# Patient Record
Sex: FEMALE | Race: WHITE | URBAN - METROPOLITAN AREA
[De-identification: names, ages, dates, MRNs, and addresses within clinical notes are randomized per-mention and may not be internally consistent; named-entity substitution may affect disease eponyms.]

---

## 2017-06-14 ENCOUNTER — OUTPATIENT (OUTPATIENT)
Dept: OUTPATIENT SERVICES | Facility: HOSPITAL | Age: 52
LOS: 1 days | Discharge: HOME | End: 2017-06-14

## 2017-06-28 DIAGNOSIS — E55.9 VITAMIN D DEFICIENCY, UNSPECIFIED: ICD-10-CM

## 2017-06-28 DIAGNOSIS — R53.83 OTHER FATIGUE: ICD-10-CM

## 2017-06-28 DIAGNOSIS — D72.829 ELEVATED WHITE BLOOD CELL COUNT, UNSPECIFIED: ICD-10-CM

## 2017-06-28 DIAGNOSIS — R94.5 ABNORMAL RESULTS OF LIVER FUNCTION STUDIES: ICD-10-CM

## 2017-06-28 DIAGNOSIS — R21 RASH AND OTHER NONSPECIFIC SKIN ERUPTION: ICD-10-CM

## 2017-06-28 DIAGNOSIS — D64.9 ANEMIA, UNSPECIFIED: ICD-10-CM

## 2017-12-29 PROBLEM — Z00.00 ENCOUNTER FOR PREVENTIVE HEALTH EXAMINATION: Status: ACTIVE | Noted: 2017-12-29

## 2018-03-07 ENCOUNTER — APPOINTMENT (OUTPATIENT)
Dept: PLASTIC SURGERY | Facility: CLINIC | Age: 53
End: 2018-03-07

## 2018-05-24 ENCOUNTER — OUTPATIENT (OUTPATIENT)
Dept: OUTPATIENT SERVICES | Facility: HOSPITAL | Age: 53
LOS: 1 days | Discharge: HOME | End: 2018-05-24

## 2018-05-24 DIAGNOSIS — R21 RASH AND OTHER NONSPECIFIC SKIN ERUPTION: ICD-10-CM

## 2018-05-24 DIAGNOSIS — R10.9 UNSPECIFIED ABDOMINAL PAIN: ICD-10-CM

## 2018-05-24 DIAGNOSIS — I10 ESSENTIAL (PRIMARY) HYPERTENSION: ICD-10-CM

## 2018-05-25 ENCOUNTER — OUTPATIENT (OUTPATIENT)
Dept: OUTPATIENT SERVICES | Facility: HOSPITAL | Age: 53
LOS: 1 days | Discharge: HOME | End: 2018-05-25

## 2018-05-25 DIAGNOSIS — R10.9 UNSPECIFIED ABDOMINAL PAIN: ICD-10-CM

## 2018-05-27 ENCOUNTER — OUTPATIENT (OUTPATIENT)
Dept: OUTPATIENT SERVICES | Facility: HOSPITAL | Age: 53
LOS: 1 days | Discharge: HOME | End: 2018-05-27

## 2018-05-27 DIAGNOSIS — D17.71 BENIGN LIPOMATOUS NEOPLASM OF KIDNEY: ICD-10-CM

## 2018-05-27 DIAGNOSIS — R19.7 DIARRHEA, UNSPECIFIED: ICD-10-CM

## 2018-05-27 DIAGNOSIS — R10.9 UNSPECIFIED ABDOMINAL PAIN: ICD-10-CM

## 2018-05-27 DIAGNOSIS — N20.0 CALCULUS OF KIDNEY: ICD-10-CM

## 2018-06-15 ENCOUNTER — OUTPATIENT (OUTPATIENT)
Dept: OUTPATIENT SERVICES | Facility: HOSPITAL | Age: 53
LOS: 1 days | Discharge: HOME | End: 2018-06-15

## 2018-06-15 DIAGNOSIS — N28.9 DISORDER OF KIDNEY AND URETER, UNSPECIFIED: ICD-10-CM

## 2018-11-15 ENCOUNTER — OUTPATIENT (OUTPATIENT)
Dept: OUTPATIENT SERVICES | Facility: HOSPITAL | Age: 53
LOS: 1 days | Discharge: HOME | End: 2018-11-15

## 2018-11-15 DIAGNOSIS — N64.4 MASTODYNIA: ICD-10-CM

## 2018-11-15 DIAGNOSIS — N63.10 UNSPECIFIED LUMP IN THE RIGHT BREAST, UNSPECIFIED QUADRANT: ICD-10-CM

## 2018-11-15 DIAGNOSIS — N63.20 UNSPECIFIED LUMP IN THE LEFT BREAST, UNSPECIFIED QUADRANT: ICD-10-CM

## 2019-01-23 ENCOUNTER — OUTPATIENT (OUTPATIENT)
Dept: OUTPATIENT SERVICES | Facility: HOSPITAL | Age: 54
LOS: 1 days | Discharge: HOME | End: 2019-01-23

## 2019-01-23 DIAGNOSIS — R94.5 ABNORMAL RESULTS OF LIVER FUNCTION STUDIES: ICD-10-CM

## 2019-01-23 DIAGNOSIS — R10.9 UNSPECIFIED ABDOMINAL PAIN: ICD-10-CM

## 2019-01-23 DIAGNOSIS — J01.01 ACUTE RECURRENT MAXILLARY SINUSITIS: ICD-10-CM

## 2019-01-23 DIAGNOSIS — R10.2 PELVIC AND PERINEAL PAIN: ICD-10-CM

## 2019-01-23 DIAGNOSIS — M54.5 LOW BACK PAIN: ICD-10-CM

## 2019-01-23 DIAGNOSIS — R42 DIZZINESS AND GIDDINESS: ICD-10-CM

## 2019-01-25 ENCOUNTER — OUTPATIENT (OUTPATIENT)
Dept: OUTPATIENT SERVICES | Facility: HOSPITAL | Age: 54
LOS: 1 days | Discharge: HOME | End: 2019-01-25

## 2019-01-25 DIAGNOSIS — R42 DIZZINESS AND GIDDINESS: ICD-10-CM

## 2019-01-26 ENCOUNTER — OUTPATIENT (OUTPATIENT)
Dept: OUTPATIENT SERVICES | Facility: HOSPITAL | Age: 54
LOS: 1 days | Discharge: HOME | End: 2019-01-26

## 2019-01-26 DIAGNOSIS — R10.11 RIGHT UPPER QUADRANT PAIN: ICD-10-CM

## 2019-02-06 ENCOUNTER — OUTPATIENT (OUTPATIENT)
Dept: OUTPATIENT SERVICES | Facility: HOSPITAL | Age: 54
LOS: 1 days | Discharge: HOME | End: 2019-02-06

## 2019-02-06 DIAGNOSIS — R10.11 RIGHT UPPER QUADRANT PAIN: ICD-10-CM

## 2019-02-15 ENCOUNTER — APPOINTMENT (OUTPATIENT)
Dept: BREAST CENTER | Facility: CLINIC | Age: 54
End: 2019-02-15
Payer: MEDICARE

## 2019-02-15 VITALS
BODY MASS INDEX: 19.99 KG/M2 | DIASTOLIC BLOOD PRESSURE: 76 MMHG | WEIGHT: 120 LBS | HEIGHT: 65 IN | SYSTOLIC BLOOD PRESSURE: 122 MMHG | TEMPERATURE: 97.7 F

## 2019-02-15 DIAGNOSIS — R92.2 INCONCLUSIVE MAMMOGRAM: ICD-10-CM

## 2019-02-15 DIAGNOSIS — Z85.828 PERSONAL HISTORY OF OTHER MALIGNANT NEOPLASM OF SKIN: ICD-10-CM

## 2019-02-15 DIAGNOSIS — Z87.442 PERSONAL HISTORY OF URINARY CALCULI: ICD-10-CM

## 2019-02-15 DIAGNOSIS — N64.4 MASTODYNIA: ICD-10-CM

## 2019-02-15 DIAGNOSIS — Z80.3 FAMILY HISTORY OF MALIGNANT NEOPLASM OF BREAST: ICD-10-CM

## 2019-02-15 PROCEDURE — 99203 OFFICE O/P NEW LOW 30 MIN: CPT

## 2019-02-18 PROBLEM — N64.4 BREAST PAIN, LEFT: Status: ACTIVE | Noted: 2019-02-18

## 2019-02-18 PROBLEM — Z85.828 HISTORY OF BASAL CELL CARCINOMA: Status: RESOLVED | Noted: 2019-02-18 | Resolved: 2019-02-18

## 2019-02-18 PROBLEM — Z80.3 FAMILY HISTORY OF BREAST CANCER: Status: ACTIVE | Noted: 2019-02-18

## 2019-02-18 PROBLEM — Z87.442 HISTORY OF KIDNEY STONES: Status: RESOLVED | Noted: 2019-02-18 | Resolved: 2019-02-18

## 2019-02-18 RX ORDER — SUCRALFATE 1 G/1
TABLET ORAL
Refills: 0 | Status: ACTIVE | COMMUNITY

## 2019-02-18 RX ORDER — ATENOLOL 50 MG/1
TABLET ORAL
Refills: 0 | Status: ACTIVE | COMMUNITY

## 2019-02-18 RX ORDER — MECLIZINE HYDROCHLORIDE 25 MG/1
TABLET ORAL
Refills: 0 | Status: ACTIVE | COMMUNITY

## 2019-02-18 RX ORDER — AMLODIPINE BESYLATE 5 MG/1
TABLET ORAL
Refills: 0 | Status: ACTIVE | COMMUNITY

## 2019-02-18 RX ORDER — DOCUSATE SODIUM 100 MG/1
CAPSULE ORAL
Refills: 0 | Status: ACTIVE | COMMUNITY

## 2019-02-18 RX ORDER — POTASSIUM CHLORIDE 10 MEQ
CAPSULE, EXTENDED RELEASE ORAL
Refills: 0 | Status: ACTIVE | COMMUNITY

## 2019-02-18 RX ORDER — QUETIAPINE 400 MG/1
TABLET, FILM COATED ORAL
Refills: 0 | Status: ACTIVE | COMMUNITY

## 2019-02-18 RX ORDER — OMEPRAZOLE MAGNESIUM 40 MG/1
CAPSULE, DELAYED RELEASE ORAL
Refills: 0 | Status: ACTIVE | COMMUNITY

## 2019-02-18 RX ORDER — FENOFIBRATE 145 MG/1
TABLET ORAL
Refills: 0 | Status: ACTIVE | COMMUNITY

## 2019-02-18 RX ORDER — CYCLOBENZAPRINE HCL 5 MG
TABLET ORAL
Refills: 0 | Status: ACTIVE | COMMUNITY

## 2019-02-18 RX ORDER — ALPRAZOLAM 2 MG/1
TABLET ORAL
Refills: 0 | Status: ACTIVE | COMMUNITY

## 2019-02-18 RX ORDER — ZOLPIDEM TARTRATE 5 MG/1
TABLET, FILM COATED ORAL
Refills: 0 | Status: ACTIVE | COMMUNITY

## 2019-02-18 RX ORDER — SERTRALINE HYDROCHLORIDE 25 MG/1
TABLET, FILM COATED ORAL
Refills: 0 | Status: ACTIVE | COMMUNITY

## 2019-02-22 ENCOUNTER — OTHER (OUTPATIENT)
Age: 54
End: 2019-02-22

## 2019-02-22 ENCOUNTER — APPOINTMENT (OUTPATIENT)
Dept: OTOLARYNGOLOGY | Facility: CLINIC | Age: 54
End: 2019-02-22
Payer: MEDICARE

## 2019-02-22 DIAGNOSIS — J32.0 CHRONIC MAXILLARY SINUSITIS: ICD-10-CM

## 2019-02-22 DIAGNOSIS — F32.9 MAJOR DEPRESSIVE DISORDER, SINGLE EPISODE, UNSPECIFIED: ICD-10-CM

## 2019-02-22 DIAGNOSIS — I10 ESSENTIAL (PRIMARY) HYPERTENSION: ICD-10-CM

## 2019-02-22 DIAGNOSIS — F17.200 NICOTINE DEPENDENCE, UNSPECIFIED, UNCOMPLICATED: ICD-10-CM

## 2019-02-22 PROCEDURE — 99204 OFFICE O/P NEW MOD 45 MIN: CPT | Mod: 25

## 2019-02-22 PROCEDURE — 31231 NASAL ENDOSCOPY DX: CPT

## 2019-02-22 NOTE — PHYSICAL EXAM
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

## 2019-02-22 NOTE — ASSESSMENT
[FreeTextEntry1] : - rx for nasoneb given to patient (written rx)\par - will refer to oral surgery, Dr Liriano to discuss surgical repair options, would do jointly with oral surgery\par - f/up in 1-2 months

## 2019-02-22 NOTE — CONSULT LETTER
[Dear  ___] : Dear  [unfilled], [Consult Letter:] : I had the pleasure of evaluating your patient, [unfilled]. [Please see my note below.] : Please see my note below. [Consult Closing:] : Thank you very much for allowing me to participate in the care of this patient.  If you have any questions, please do not hesitate to contact me. [Sincerely,] : Sincerely, [FreeTextEntry2] : Dr. Jr Anna [FreeTextEntry3] : Francesca Anders MD\par Otolaryngology - Head & Neck Surgery\par

## 2019-02-22 NOTE — HISTORY OF PRESENT ILLNESS
[de-identified] : Patient here today due to hole in her palate. Patient admits she has it since she was young. Patient admits it is getting bigger. She has been using a machine to clean the area, unable to clean it recently as machine broke 1.5 months ago, machine ordered by ENT (nasal nebulizer) She has nasal congestion. She has false teeth which covers the hole up. Patient admits she had the opening as a child, it has gotten bigger. She used to inhale moth balls. Sore throat from time to time. Hx basal cell carcinoma external nose removed 10 years ago.

## 2019-02-26 ENCOUNTER — OUTPATIENT (OUTPATIENT)
Dept: OUTPATIENT SERVICES | Facility: HOSPITAL | Age: 54
LOS: 1 days | Discharge: HOME | End: 2019-02-26

## 2019-02-26 DIAGNOSIS — R42 DIZZINESS AND GIDDINESS: ICD-10-CM

## 2019-03-20 NOTE — DATA REVIEWED
[FreeTextEntry1] : EXAM: US BREAST COMPLETE BI \par EXAM: MG MAMMO DIAG W MISAEL BI# \par \par \par PROCEDURE DATE: 11/15/2018 \par \par \par \par INTERPRETATION: Clinical History / Reason for exam: Patient presents with a \par palpable lump in the periareolar region of each breast. \par \par The patient reports her last clinical breast examination was performed over \par one year ago. \par \par Spot magnification imaging of the symptomatic areas was performed in \par addition to routine mammographic projections. \par \par Computer-aided detection was utilized in the interpretation of this \par examination. \par \par Comparison is made to the prior examination dated February 17, 2012. \par \par Breast composition:The breasts are heterogeneously dense, which may obscure \par small masses. \par \par There are no suspicious masses, areas of architectural distortion or cluster \par of microcalcifications in either breast. The spot magnification views of the \par symptomatic area show no suspicious findings. \par \par Whole Bilateral Breast Sonogram: \par \par No solid/cystic lesions or areas of abnormal shadowing are seen in either \par breast. \par \par Evaluation of both axillary regions demonstrates normal-appearing lymph \par nodes. \par \par IMPRESSION: No mammographic or sonographic evidence of malignancy. \par \par No mammographic or sonographic correlate for the reported palpable \par abnormality in each breast. The failure to confirm a lesion mammographically \par or sonographically should not deter biopsy if there is felt to be a \par clinically suspicious palpable abnormality. \par \par Recommendation: Any decision to biopsy the palpable abnormality should be \par based on the level of clinical concern. \par \par BI-RADS Category 1: Negative \par \par \par \par \par \par \par \par SAÚL HIRSCH M.D., ATTENDING RADIOLOGIST \par This document has been electronically signed. Nov 15 2018 3:48PM \par \par EXAM: US BREAST COMPLETE BI \par EXAM: MG MAMMO DIAG W MISAEL BI# \par \par \par PROCEDURE DATE: 11/15/2018 \par \par \par \par INTERPRETATION: Clinical History / Reason for exam: Patient presents with a \par palpable lump in the periareolar region of each breast. \par \par The patient reports her last clinical breast examination was performed over \par one year ago. \par \par Spot magnification imaging of the symptomatic areas was performed in \par addition to routine mammographic projections. \par \par Computer-aided detection was utilized in the interpretation of this \par examination. \par \par Comparison is made to the prior examination dated February 17, 2012. \par \par Breast composition:The breasts are heterogeneously dense, which may obscure \par small masses. \par \par There are no suspicious masses, areas of architectural distortion or cluster \par of microcalcifications in either breast. The spot magnification views of the \par symptomatic area show no suspicious findings. \par \par Whole Bilateral Breast Sonogram: \par \par No solid/cystic lesions or areas of abnormal shadowing are seen in either \par breast. \par \par Evaluation of both axillary regions demonstrates normal-appearing lymph \par nodes. \par \par IMPRESSION: No mammographic or sonographic evidence of malignancy. \par \par No mammographic or sonographic correlate for the reported palpable \par abnormality in each breast. The failure to confirm a lesion mammographically \par or sonographically should not deter biopsy if there is felt to be a \par clinically suspicious palpable abnormality. \par \par Recommendation: Any decision to biopsy the palpable abnormality should be \par based on the level of clinical concern. \par \par BI-RADS Category 1: Negative \par \par \par \par \par \par \par \par SAÚL HIRSCH M.D., ATTENDING RADIOLOGIST \par This document has been electronically signed. Nov 15 2018 3:48PM \par \par

## 2019-03-20 NOTE — CONSULT LETTER
[Dear  ___] : Dear  [unfilled], [Consult Letter:] : I had the pleasure of evaluating your patient, [unfilled]. [Please see my note below.] : Please see my note below. [Consult Closing:] : Thank you very much for allowing me to participate in the care of this patient.  If you have any questions, please do not hesitate to contact me. [Sincerely,] : Sincerely, [FreeTextEntry2] : Jr Anna MD\par [address], [address]\[place] [address] [FreeTextEntry3] : Amarilys Osman MD \par Breast Surgical Oncologist\par [facility]\par [facility]\par [facility]\par

## 2019-03-20 NOTE — ASSESSMENT
[FreeTextEntry1] : Magali is a 53 F who presents with left breast pain and dense breasts. \par \par On exam, no suspicious masses were palpated in either breast.  She has chronic R nipple inversion and a fullness in the left retroareolar/UOQ area.  Her most recent imaging was a b/l dx tomosynthesis and US on 11/15/18 which was also unrevealing for any suspicious lesions.  She will be due for her next screening mammogram on 11/15/19.  This will be scheduled for her today. \par \par We discussed dense breasts.  Increasing breast density has been found to increase ones risk of breast cancer, but at this time, there is no clear indication for additional imaging in this setting, as both US and MRI have not been found to improve survival.  One can consider bilateral screening US.  However, out of 1000 women screened, the use of routine US will only identify an additional 3-5 cancers.  The use of US was found to increase the likelihood of undergoing more imaging and more biopsies.  She does have dense breasts.  We have decided to proceed with screening bilateral breast US at this time.  This will be scheduled with her next screening mammogram.\par \par In regards to her breast pain, it may be related to fibrocystic changes within her breast that are hormonally influenced. We spoke about possible interventions including evening primrose oil, supportive bras, and decreasing caffeine intake.  Although none of these have been consistently proven to improve breast pain, they may be tried.  If the pain becomes very severe, there have been studies of tamoxifen being effective for the treatment of breast pain, although there are risks with tamoxifen.  At this time she will try supportive measures.\par \par She is otherwise at an average risk for breast cancer based off her family history.  She should continue with annual screening mammograms/US. \par \par ALl of her questions were answered.  She knows to call back with any further questions or concerns. \par \par PLAN: \par -b/l mammogram and US\par -f/up after imaging

## 2019-03-20 NOTE — PHYSICAL EXAM
[Normocephalic] : normocephalic [Atraumatic] : atraumatic [EOMI] : extra ocular movement intact [No Supraclavicular Adenopathy] : no supraclavicular adenopathy [No Cervical Adenopathy] : no cervical adenopathy [No dominant masses] : no dominant masses in right breast  [No dominant masses] : no dominant masses left breast [No Nipple Retraction] : no left nipple retraction [No Nipple Discharge] : no left nipple discharge [Examined in the supine and seated position] : examined in the supine and seated position [No Axillary Lymphadenopathy] : no left axillary lymphadenopathy [Soft] : abdomen soft [Not Tender] : non-tender [No Edema] : no edema [No Swelling] : no swelling [No Rashes] : no rashes [No Ulceration] : no ulceration [de-identified] : no suspicious masses palpated; nipple inversion (has been present for many years) [de-identified] : fullness in the retroareolar area/UOQ, but no suspicious masses palpated

## 2019-03-20 NOTE — PAST MEDICAL HISTORY
[Menarche Age ____] : age at menarche was [unfilled] [Menopause Age____] : age at menopause was [unfilled] [Total Preg ___] : G[unfilled] [Live Births ___] : P[unfilled]  [Age At Live Birth ___] : Age at live birth: [unfilled] [History of Hormone Replacement Treatment] : has no history of hormone replacement treatment [FreeTextEntry5] : denies  [FreeTextEntry6] : denies [FreeTextEntry7] : yes in past x 1 year, stopped in 1987 [FreeTextEntry8] : denies
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
## 2019-03-20 NOTE — REVIEW OF SYSTEMS
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

## 2019-11-11 ENCOUNTER — OUTPATIENT (OUTPATIENT)
Dept: OUTPATIENT SERVICES | Facility: HOSPITAL | Age: 54
LOS: 1 days | Discharge: HOME | End: 2019-11-11
Payer: MEDICARE

## 2019-11-11 ENCOUNTER — OUTPATIENT (OUTPATIENT)
Dept: OUTPATIENT SERVICES | Facility: HOSPITAL | Age: 54
LOS: 1 days | Discharge: HOME | End: 2019-11-11

## 2019-11-11 DIAGNOSIS — E04.1 NONTOXIC SINGLE THYROID NODULE: ICD-10-CM

## 2019-11-11 DIAGNOSIS — E03.9 HYPOTHYROIDISM, UNSPECIFIED: ICD-10-CM

## 2019-11-11 DIAGNOSIS — I10 ESSENTIAL (PRIMARY) HYPERTENSION: ICD-10-CM

## 2019-11-11 DIAGNOSIS — E78.9 DISORDER OF LIPOPROTEIN METABOLISM, UNSPECIFIED: ICD-10-CM

## 2019-11-11 PROCEDURE — 76536 US EXAM OF HEAD AND NECK: CPT | Mod: 26

## 2019-11-13 DIAGNOSIS — E78.9 DISORDER OF LIPOPROTEIN METABOLISM, UNSPECIFIED: ICD-10-CM

## 2019-11-13 DIAGNOSIS — I10 ESSENTIAL (PRIMARY) HYPERTENSION: ICD-10-CM

## 2019-11-13 DIAGNOSIS — Z02.9 ENCOUNTER FOR ADMINISTRATIVE EXAMINATIONS, UNSPECIFIED: ICD-10-CM

## 2019-11-13 DIAGNOSIS — E03.9 HYPOTHYROIDISM, UNSPECIFIED: ICD-10-CM

## 2019-11-19 ENCOUNTER — FORM ENCOUNTER (OUTPATIENT)
Age: 54
End: 2019-11-19

## 2019-11-20 ENCOUNTER — OUTPATIENT (OUTPATIENT)
Dept: OUTPATIENT SERVICES | Facility: HOSPITAL | Age: 54
LOS: 1 days | Discharge: HOME | End: 2019-11-20
Payer: MEDICARE

## 2019-11-20 DIAGNOSIS — R92.2 INCONCLUSIVE MAMMOGRAM: ICD-10-CM

## 2019-11-20 DIAGNOSIS — Z12.31 ENCOUNTER FOR SCREENING MAMMOGRAM FOR MALIGNANT NEOPLASM OF BREAST: ICD-10-CM

## 2019-11-20 PROCEDURE — 77063 BREAST TOMOSYNTHESIS BI: CPT | Mod: 26

## 2019-11-20 PROCEDURE — 77067 SCR MAMMO BI INCL CAD: CPT | Mod: 26

## 2019-11-20 PROCEDURE — 76641 ULTRASOUND BREAST COMPLETE: CPT | Mod: 26,50

## 2019-11-21 ENCOUNTER — OUTPATIENT (OUTPATIENT)
Dept: OUTPATIENT SERVICES | Facility: HOSPITAL | Age: 54
LOS: 1 days | Discharge: HOME | End: 2019-11-21

## 2019-11-21 DIAGNOSIS — R73.9 HYPERGLYCEMIA, UNSPECIFIED: ICD-10-CM

## 2020-12-04 ENCOUNTER — APPOINTMENT (OUTPATIENT)
Dept: BREAST CENTER | Facility: CLINIC | Age: 55
End: 2020-12-04

## 2021-06-03 ENCOUNTER — OUTPATIENT (OUTPATIENT)
Dept: OUTPATIENT SERVICES | Facility: HOSPITAL | Age: 56
LOS: 1 days | Discharge: HOME | End: 2021-06-03
Payer: MEDICARE

## 2021-06-03 DIAGNOSIS — Z12.31 ENCOUNTER FOR SCREENING MAMMOGRAM FOR MALIGNANT NEOPLASM OF BREAST: ICD-10-CM

## 2021-06-03 PROCEDURE — 77063 BREAST TOMOSYNTHESIS BI: CPT | Mod: 26

## 2021-06-03 PROCEDURE — 77067 SCR MAMMO BI INCL CAD: CPT | Mod: 26

## 2021-06-04 DIAGNOSIS — Z13.820 ENCOUNTER FOR SCREENING FOR OSTEOPOROSIS: ICD-10-CM

## 2021-06-04 DIAGNOSIS — Z78.0 ASYMPTOMATIC MENOPAUSAL STATE: ICD-10-CM

## 2021-06-04 DIAGNOSIS — M89.9 DISORDER OF BONE, UNSPECIFIED: ICD-10-CM

## 2021-08-05 ENCOUNTER — OUTPATIENT (OUTPATIENT)
Dept: OUTPATIENT SERVICES | Facility: HOSPITAL | Age: 56
LOS: 1 days | Discharge: HOME | End: 2021-08-05
Payer: MEDICARE

## 2021-08-05 DIAGNOSIS — R07.9 CHEST PAIN, UNSPECIFIED: ICD-10-CM

## 2021-08-05 PROCEDURE — 71046 X-RAY EXAM CHEST 2 VIEWS: CPT | Mod: 26

## 2021-08-17 ENCOUNTER — OUTPATIENT (OUTPATIENT)
Dept: OUTPATIENT SERVICES | Facility: HOSPITAL | Age: 56
LOS: 1 days | Discharge: HOME | End: 2021-08-17
Payer: MEDICARE

## 2021-08-17 DIAGNOSIS — R10.13 EPIGASTRIC PAIN: ICD-10-CM

## 2021-08-17 PROCEDURE — 74176 CT ABD & PELVIS W/O CONTRAST: CPT | Mod: 26,MH

## 2021-08-17 PROCEDURE — 71250 CT THORAX DX C-: CPT | Mod: 26,MH

## 2021-08-17 PROCEDURE — 76700 US EXAM ABDOM COMPLETE: CPT | Mod: 26

## 2021-09-04 ENCOUNTER — OUTPATIENT (OUTPATIENT)
Dept: OUTPATIENT SERVICES | Facility: HOSPITAL | Age: 56
LOS: 1 days | Discharge: HOME | End: 2021-09-04
Payer: MEDICARE

## 2021-09-04 DIAGNOSIS — R07.81 PLEURODYNIA: ICD-10-CM

## 2021-09-04 PROCEDURE — 71100 X-RAY EXAM RIBS UNI 2 VIEWS: CPT | Mod: 26,LT

## 2022-04-07 ENCOUNTER — OUTPATIENT (OUTPATIENT)
Dept: OUTPATIENT SERVICES | Facility: HOSPITAL | Age: 57
LOS: 1 days | Discharge: HOME | End: 2022-04-07
Payer: MEDICARE

## 2022-04-07 DIAGNOSIS — R22.9 LOCALIZED SWELLING, MASS AND LUMP, UNSPECIFIED: ICD-10-CM

## 2022-04-07 DIAGNOSIS — R07.82 INTERCOSTAL PAIN: ICD-10-CM

## 2022-04-07 DIAGNOSIS — M25.559 PAIN IN UNSPECIFIED HIP: ICD-10-CM

## 2022-04-07 PROCEDURE — 70492 CT SFT TSUE NCK W/O & W/DYE: CPT | Mod: 26,MH

## 2022-04-07 PROCEDURE — 73502 X-RAY EXAM HIP UNI 2-3 VIEWS: CPT | Mod: 26,LT

## 2022-04-07 PROCEDURE — 73552 X-RAY EXAM OF FEMUR 2/>: CPT | Mod: 26,LT

## 2022-04-07 PROCEDURE — 71100 X-RAY EXAM RIBS UNI 2 VIEWS: CPT | Mod: 26,RT

## 2022-08-31 ENCOUNTER — OUTPATIENT (OUTPATIENT)
Dept: OUTPATIENT SERVICES | Facility: HOSPITAL | Age: 57
LOS: 1 days | Discharge: HOME | End: 2022-08-31

## 2022-08-31 DIAGNOSIS — R06.02 SHORTNESS OF BREATH: ICD-10-CM

## 2022-08-31 PROCEDURE — 75574 CT ANGIO HRT W/3D IMAGE: CPT | Mod: 26,MH

## 2022-09-06 ENCOUNTER — OUTPATIENT (OUTPATIENT)
Dept: OUTPATIENT SERVICES | Facility: HOSPITAL | Age: 57
LOS: 1 days | Discharge: HOME | End: 2022-09-06

## 2022-09-06 DIAGNOSIS — R93.1 ABNORMAL FINDINGS ON DIAGNOSTIC IMAGING OF HEART AND CORONARY CIRCULATION: ICD-10-CM

## 2022-09-06 PROCEDURE — 0504T: CPT

## 2022-09-12 DIAGNOSIS — R07.9 CHEST PAIN, UNSPECIFIED: ICD-10-CM

## 2022-09-19 DIAGNOSIS — R07.9 CHEST PAIN, UNSPECIFIED: ICD-10-CM

## 2023-04-04 ENCOUNTER — INPATIENT (INPATIENT)
Facility: HOSPITAL | Age: 58
LOS: 12 days | Discharge: HOME | DRG: 929 | End: 2023-04-17
Attending: PLASTIC SURGERY | Admitting: PLASTIC SURGERY
Payer: MEDICARE

## 2023-04-04 VITALS
WEIGHT: 164.91 LBS | SYSTOLIC BLOOD PRESSURE: 185 MMHG | DIASTOLIC BLOOD PRESSURE: 84 MMHG | HEART RATE: 90 BPM | RESPIRATION RATE: 15 BRPM | OXYGEN SATURATION: 98 % | TEMPERATURE: 97 F

## 2023-04-04 DIAGNOSIS — T30.0 BURN OF UNSPECIFIED BODY REGION, UNSPECIFIED DEGREE: ICD-10-CM

## 2023-04-04 DIAGNOSIS — Z98.890 OTHER SPECIFIED POSTPROCEDURAL STATES: Chronic | ICD-10-CM

## 2023-04-04 DIAGNOSIS — N20.0 CALCULUS OF KIDNEY: Chronic | ICD-10-CM

## 2023-04-04 LAB
ALBUMIN SERPL ELPH-MCNC: 4.5 G/DL — SIGNIFICANT CHANGE UP (ref 3.5–5.2)
ALBUMIN SERPL ELPH-MCNC: 4.8 G/DL — SIGNIFICANT CHANGE UP (ref 3.5–5.2)
ALP SERPL-CCNC: 102 U/L — SIGNIFICANT CHANGE UP (ref 30–115)
ALP SERPL-CCNC: 105 U/L — SIGNIFICANT CHANGE UP (ref 30–115)
ALT FLD-CCNC: 26 U/L — SIGNIFICANT CHANGE UP (ref 0–41)
ALT FLD-CCNC: 32 U/L — SIGNIFICANT CHANGE UP (ref 0–41)
ANION GAP SERPL CALC-SCNC: 11 MMOL/L — SIGNIFICANT CHANGE UP (ref 7–14)
ANION GAP SERPL CALC-SCNC: 12 MMOL/L — SIGNIFICANT CHANGE UP (ref 7–14)
AST SERPL-CCNC: 21 U/L — SIGNIFICANT CHANGE UP (ref 0–41)
AST SERPL-CCNC: 24 U/L — SIGNIFICANT CHANGE UP (ref 0–41)
BASOPHILS # BLD AUTO: 0.09 K/UL — SIGNIFICANT CHANGE UP (ref 0–0.2)
BASOPHILS # BLD AUTO: 0.11 K/UL — SIGNIFICANT CHANGE UP (ref 0–0.2)
BASOPHILS NFR BLD AUTO: 0.7 % — SIGNIFICANT CHANGE UP (ref 0–1)
BASOPHILS NFR BLD AUTO: 0.8 % — SIGNIFICANT CHANGE UP (ref 0–1)
BILIRUB SERPL-MCNC: 0.3 MG/DL — SIGNIFICANT CHANGE UP (ref 0.2–1.2)
BILIRUB SERPL-MCNC: 0.5 MG/DL — SIGNIFICANT CHANGE UP (ref 0.2–1.2)
BUN SERPL-MCNC: 20 MG/DL — SIGNIFICANT CHANGE UP (ref 10–20)
BUN SERPL-MCNC: 23 MG/DL — HIGH (ref 10–20)
CALCIUM SERPL-MCNC: 10.2 MG/DL — SIGNIFICANT CHANGE UP (ref 8.4–10.4)
CALCIUM SERPL-MCNC: 9.7 MG/DL — SIGNIFICANT CHANGE UP (ref 8.4–10.5)
CHLORIDE SERPL-SCNC: 102 MMOL/L — SIGNIFICANT CHANGE UP (ref 98–110)
CHLORIDE SERPL-SCNC: 104 MMOL/L — SIGNIFICANT CHANGE UP (ref 98–110)
CO2 SERPL-SCNC: 25 MMOL/L — SIGNIFICANT CHANGE UP (ref 17–32)
CO2 SERPL-SCNC: 28 MMOL/L — SIGNIFICANT CHANGE UP (ref 17–32)
CREAT SERPL-MCNC: 0.6 MG/DL — LOW (ref 0.7–1.5)
CREAT SERPL-MCNC: 0.7 MG/DL — SIGNIFICANT CHANGE UP (ref 0.7–1.5)
EGFR: 101 ML/MIN/1.73M2 — SIGNIFICANT CHANGE UP
EGFR: 105 ML/MIN/1.73M2 — SIGNIFICANT CHANGE UP
EOSINOPHIL # BLD AUTO: 0.33 K/UL — SIGNIFICANT CHANGE UP (ref 0–0.7)
EOSINOPHIL # BLD AUTO: 0.34 K/UL — SIGNIFICANT CHANGE UP (ref 0–0.7)
EOSINOPHIL NFR BLD AUTO: 2.2 % — SIGNIFICANT CHANGE UP (ref 0–8)
EOSINOPHIL NFR BLD AUTO: 2.9 % — SIGNIFICANT CHANGE UP (ref 0–8)
GLUCOSE SERPL-MCNC: 100 MG/DL — HIGH (ref 70–99)
GLUCOSE SERPL-MCNC: 92 MG/DL — SIGNIFICANT CHANGE UP (ref 70–99)
HCT VFR BLD CALC: 37.4 % — SIGNIFICANT CHANGE UP (ref 37–47)
HCT VFR BLD CALC: 41.3 % — SIGNIFICANT CHANGE UP (ref 37–47)
HGB BLD-MCNC: 12.9 G/DL — SIGNIFICANT CHANGE UP (ref 12–16)
HGB BLD-MCNC: 13.8 G/DL — SIGNIFICANT CHANGE UP (ref 12–16)
IMM GRANULOCYTES NFR BLD AUTO: 0.3 % — SIGNIFICANT CHANGE UP (ref 0.1–0.3)
IMM GRANULOCYTES NFR BLD AUTO: 0.4 % — HIGH (ref 0.1–0.3)
LACTATE SERPL-SCNC: 0.8 MMOL/L — SIGNIFICANT CHANGE UP (ref 0.7–2)
LYMPHOCYTES # BLD AUTO: 1.63 K/UL — SIGNIFICANT CHANGE UP (ref 1.2–3.4)
LYMPHOCYTES # BLD AUTO: 1.82 K/UL — SIGNIFICANT CHANGE UP (ref 1.2–3.4)
LYMPHOCYTES # BLD AUTO: 10.8 % — LOW (ref 20.5–51.1)
LYMPHOCYTES # BLD AUTO: 16.2 % — LOW (ref 20.5–51.1)
MAGNESIUM SERPL-MCNC: 1.6 MG/DL — LOW (ref 1.8–2.4)
MCHC RBC-ENTMCNC: 30.6 PG — SIGNIFICANT CHANGE UP (ref 27–31)
MCHC RBC-ENTMCNC: 31.5 PG — HIGH (ref 27–31)
MCHC RBC-ENTMCNC: 33.4 G/DL — SIGNIFICANT CHANGE UP (ref 32–37)
MCHC RBC-ENTMCNC: 34.5 G/DL — SIGNIFICANT CHANGE UP (ref 32–37)
MCV RBC AUTO: 91.2 FL — SIGNIFICANT CHANGE UP (ref 81–99)
MCV RBC AUTO: 91.6 FL — SIGNIFICANT CHANGE UP (ref 81–99)
MONOCYTES # BLD AUTO: 0.75 K/UL — HIGH (ref 0.1–0.6)
MONOCYTES # BLD AUTO: 0.92 K/UL — HIGH (ref 0.1–0.6)
MONOCYTES NFR BLD AUTO: 6.1 % — SIGNIFICANT CHANGE UP (ref 1.7–9.3)
MONOCYTES NFR BLD AUTO: 6.7 % — SIGNIFICANT CHANGE UP (ref 1.7–9.3)
NEUTROPHILS # BLD AUTO: 12.11 K/UL — HIGH (ref 1.4–6.5)
NEUTROPHILS # BLD AUTO: 8.22 K/UL — HIGH (ref 1.4–6.5)
NEUTROPHILS NFR BLD AUTO: 73 % — SIGNIFICANT CHANGE UP (ref 42.2–75.2)
NEUTROPHILS NFR BLD AUTO: 79.9 % — HIGH (ref 42.2–75.2)
NRBC # BLD: 0 /100 WBCS — SIGNIFICANT CHANGE UP (ref 0–0)
NRBC # BLD: 0 /100 WBCS — SIGNIFICANT CHANGE UP (ref 0–0)
PHOSPHATE SERPL-MCNC: 3.7 MG/DL — SIGNIFICANT CHANGE UP (ref 2.1–4.9)
PLATELET # BLD AUTO: 301 K/UL — SIGNIFICANT CHANGE UP (ref 130–400)
PLATELET # BLD AUTO: 344 K/UL — SIGNIFICANT CHANGE UP (ref 130–400)
POTASSIUM SERPL-MCNC: 3.4 MMOL/L — LOW (ref 3.5–5)
POTASSIUM SERPL-MCNC: 3.8 MMOL/L — SIGNIFICANT CHANGE UP (ref 3.5–5)
POTASSIUM SERPL-SCNC: 3.4 MMOL/L — LOW (ref 3.5–5)
POTASSIUM SERPL-SCNC: 3.8 MMOL/L — SIGNIFICANT CHANGE UP (ref 3.5–5)
PROT SERPL-MCNC: 6.6 G/DL — SIGNIFICANT CHANGE UP (ref 6–8)
PROT SERPL-MCNC: 7.4 G/DL — SIGNIFICANT CHANGE UP (ref 6–8)
RBC # BLD: 4.1 M/UL — LOW (ref 4.2–5.4)
RBC # BLD: 4.51 M/UL — SIGNIFICANT CHANGE UP (ref 4.2–5.4)
RBC # FLD: 12.1 % — SIGNIFICANT CHANGE UP (ref 11.5–14.5)
RBC # FLD: 12.2 % — SIGNIFICANT CHANGE UP (ref 11.5–14.5)
SARS-COV-2 RNA SPEC QL NAA+PROBE: SIGNIFICANT CHANGE UP
SODIUM SERPL-SCNC: 141 MMOL/L — SIGNIFICANT CHANGE UP (ref 135–146)
SODIUM SERPL-SCNC: 141 MMOL/L — SIGNIFICANT CHANGE UP (ref 135–146)
WBC # BLD: 11.26 K/UL — HIGH (ref 4.8–10.8)
WBC # BLD: 15.16 K/UL — HIGH (ref 4.8–10.8)
WBC # FLD AUTO: 11.26 K/UL — HIGH (ref 4.8–10.8)
WBC # FLD AUTO: 15.16 K/UL — HIGH (ref 4.8–10.8)

## 2023-04-04 PROCEDURE — 90714 TD VACC NO PRESV 7 YRS+ IM: CPT

## 2023-04-04 PROCEDURE — 84703 CHORIONIC GONADOTROPIN ASSAY: CPT

## 2023-04-04 PROCEDURE — 36415 COLL VENOUS BLD VENIPUNCTURE: CPT

## 2023-04-04 PROCEDURE — 84702 CHORIONIC GONADOTROPIN TEST: CPT

## 2023-04-04 PROCEDURE — 80053 COMPREHEN METABOLIC PANEL: CPT

## 2023-04-04 PROCEDURE — 93010 ELECTROCARDIOGRAM REPORT: CPT

## 2023-04-04 PROCEDURE — C9290: CPT

## 2023-04-04 PROCEDURE — 71045 X-RAY EXAM CHEST 1 VIEW: CPT

## 2023-04-04 PROCEDURE — U0005: CPT

## 2023-04-04 PROCEDURE — 99285 EMERGENCY DEPT VISIT HI MDM: CPT

## 2023-04-04 PROCEDURE — 84100 ASSAY OF PHOSPHORUS: CPT

## 2023-04-04 PROCEDURE — 88304 TISSUE EXAM BY PATHOLOGIST: CPT

## 2023-04-04 PROCEDURE — 85730 THROMBOPLASTIN TIME PARTIAL: CPT

## 2023-04-04 PROCEDURE — 85610 PROTHROMBIN TIME: CPT

## 2023-04-04 PROCEDURE — 86900 BLOOD TYPING SEROLOGIC ABO: CPT

## 2023-04-04 PROCEDURE — U0003: CPT

## 2023-04-04 PROCEDURE — 71045 X-RAY EXAM CHEST 1 VIEW: CPT | Mod: 26

## 2023-04-04 PROCEDURE — 93005 ELECTROCARDIOGRAM TRACING: CPT

## 2023-04-04 PROCEDURE — 97535 SELF CARE MNGMENT TRAINING: CPT | Mod: GO

## 2023-04-04 PROCEDURE — 97166 OT EVAL MOD COMPLEX 45 MIN: CPT | Mod: GO

## 2023-04-04 PROCEDURE — 83735 ASSAY OF MAGNESIUM: CPT

## 2023-04-04 PROCEDURE — 86803 HEPATITIS C AB TEST: CPT

## 2023-04-04 PROCEDURE — 86901 BLOOD TYPING SEROLOGIC RH(D): CPT

## 2023-04-04 PROCEDURE — 99222 1ST HOSP IP/OBS MODERATE 55: CPT

## 2023-04-04 PROCEDURE — 86850 RBC ANTIBODY SCREEN: CPT

## 2023-04-04 PROCEDURE — 85025 COMPLETE CBC W/AUTO DIFF WBC: CPT

## 2023-04-04 PROCEDURE — 97110 THERAPEUTIC EXERCISES: CPT | Mod: GO

## 2023-04-04 RX ORDER — SODIUM CHLORIDE 9 MG/ML
1000 INJECTION, SOLUTION INTRAVENOUS
Refills: 0 | Status: DISCONTINUED | OUTPATIENT
Start: 2023-04-04 | End: 2023-04-05

## 2023-04-04 RX ORDER — AMLODIPINE BESYLATE 2.5 MG/1
10 TABLET ORAL DAILY
Refills: 0 | Status: DISCONTINUED | OUTPATIENT
Start: 2023-04-04 | End: 2023-04-07

## 2023-04-04 RX ORDER — RISPERIDONE 4 MG/1
0.25 TABLET ORAL AT BEDTIME
Refills: 0 | Status: DISCONTINUED | OUTPATIENT
Start: 2023-04-04 | End: 2023-04-07

## 2023-04-04 RX ORDER — ZOLPIDEM TARTRATE 10 MG/1
5 TABLET ORAL AT BEDTIME
Refills: 0 | Status: DISCONTINUED | OUTPATIENT
Start: 2023-04-04 | End: 2023-04-05

## 2023-04-04 RX ORDER — RISPERIDONE 4 MG/1
1 TABLET ORAL
Refills: 0 | DISCHARGE

## 2023-04-04 RX ORDER — ALPRAZOLAM 0.25 MG
1 TABLET ORAL
Refills: 0 | Status: DISCONTINUED | OUTPATIENT
Start: 2023-04-04 | End: 2023-04-07

## 2023-04-04 RX ORDER — SENNA PLUS 8.6 MG/1
2 TABLET ORAL DAILY
Refills: 0 | Status: DISCONTINUED | OUTPATIENT
Start: 2023-04-04 | End: 2023-04-07

## 2023-04-04 RX ORDER — SODIUM CHLORIDE 9 MG/ML
1000 INJECTION INTRAMUSCULAR; INTRAVENOUS; SUBCUTANEOUS ONCE
Refills: 0 | Status: COMPLETED | OUTPATIENT
Start: 2023-04-04 | End: 2023-04-04

## 2023-04-04 RX ORDER — BACITRACIN ZINC 500 UNIT/G
1 OINTMENT IN PACKET (EA) TOPICAL
Refills: 0 | Status: DISCONTINUED | OUTPATIENT
Start: 2023-04-04 | End: 2023-04-17

## 2023-04-04 RX ORDER — AMPICILLIN SODIUM AND SULBACTAM SODIUM 250; 125 MG/ML; MG/ML
3 INJECTION, POWDER, FOR SUSPENSION INTRAMUSCULAR; INTRAVENOUS EVERY 6 HOURS
Refills: 0 | Status: DISCONTINUED | OUTPATIENT
Start: 2023-04-04 | End: 2023-04-07

## 2023-04-04 RX ORDER — ZOLPIDEM TARTRATE 10 MG/1
1 TABLET ORAL
Refills: 0 | DISCHARGE

## 2023-04-04 RX ORDER — OXYCODONE AND ACETAMINOPHEN 5; 325 MG/1; MG/1
1 TABLET ORAL EVERY 6 HOURS
Refills: 0 | Status: DISCONTINUED | OUTPATIENT
Start: 2023-04-04 | End: 2023-04-07

## 2023-04-04 RX ORDER — HYDROCODONE BITARTRATE AND ACETAMINOPHEN 7.5; 325 MG/15ML; MG/15ML
1 SOLUTION ORAL
Refills: 0 | DISCHARGE

## 2023-04-04 RX ORDER — TETANUS AND DIPHTHERIA TOXOIDS ADSORBED 2; 2 [LF]/.5ML; [LF]/.5ML
0.5 INJECTION INTRAMUSCULAR ONCE
Refills: 0 | Status: COMPLETED | OUTPATIENT
Start: 2023-04-04 | End: 2023-04-04

## 2023-04-04 RX ORDER — IBUPROFEN 200 MG
400 TABLET ORAL EVERY 6 HOURS
Refills: 0 | Status: DISCONTINUED | OUTPATIENT
Start: 2023-04-04 | End: 2023-04-06

## 2023-04-04 RX ORDER — AMLODIPINE BESYLATE 2.5 MG/1
1 TABLET ORAL
Refills: 0 | DISCHARGE

## 2023-04-04 RX ORDER — AMPICILLIN SODIUM AND SULBACTAM SODIUM 250; 125 MG/ML; MG/ML
3 INJECTION, POWDER, FOR SUSPENSION INTRAMUSCULAR; INTRAVENOUS ONCE
Refills: 0 | Status: COMPLETED | OUTPATIENT
Start: 2023-04-04 | End: 2023-04-04

## 2023-04-04 RX ORDER — ALPRAZOLAM 0.25 MG
1 TABLET ORAL
Refills: 0 | DISCHARGE

## 2023-04-04 RX ORDER — ICOSAPENT ETHYL 500 MG/1
2 CAPSULE, LIQUID FILLED ORAL
Refills: 0 | DISCHARGE

## 2023-04-04 RX ORDER — ATENOLOL 25 MG/1
100 TABLET ORAL DAILY
Refills: 0 | Status: DISCONTINUED | OUTPATIENT
Start: 2023-04-04 | End: 2023-04-07

## 2023-04-04 RX ORDER — CHLORHEXIDINE GLUCONATE 213 G/1000ML
1 SOLUTION TOPICAL DAILY
Refills: 0 | Status: DISCONTINUED | OUTPATIENT
Start: 2023-04-04 | End: 2023-04-07

## 2023-04-04 RX ORDER — GABAPENTIN 400 MG/1
300 CAPSULE ORAL
Refills: 0 | Status: DISCONTINUED | OUTPATIENT
Start: 2023-04-04 | End: 2023-04-07

## 2023-04-04 RX ORDER — RISPERIDONE 4 MG/1
1 TABLET ORAL DAILY
Refills: 0 | Status: DISCONTINUED | OUTPATIENT
Start: 2023-04-04 | End: 2023-04-04

## 2023-04-04 RX ORDER — GABAPENTIN 400 MG/1
1 CAPSULE ORAL
Refills: 0 | DISCHARGE

## 2023-04-04 RX ORDER — ENOXAPARIN SODIUM 100 MG/ML
40 INJECTION SUBCUTANEOUS EVERY 24 HOURS
Refills: 0 | Status: DISCONTINUED | OUTPATIENT
Start: 2023-04-04 | End: 2023-04-07

## 2023-04-04 RX ORDER — BEMPEDOIC ACID 180 MG/1
1 TABLET, FILM COATED ORAL
Refills: 0 | DISCHARGE

## 2023-04-04 RX ORDER — MORPHINE SULFATE 50 MG/1
2 CAPSULE, EXTENDED RELEASE ORAL
Refills: 0 | Status: DISCONTINUED | OUTPATIENT
Start: 2023-04-04 | End: 2023-04-07

## 2023-04-04 RX ADMIN — Medication 400 MILLIGRAM(S): at 22:27

## 2023-04-04 RX ADMIN — Medication 1 APPLICATION(S): at 21:49

## 2023-04-04 RX ADMIN — ZOLPIDEM TARTRATE 5 MILLIGRAM(S): 10 TABLET ORAL at 21:14

## 2023-04-04 RX ADMIN — Medication 1 MILLIGRAM(S): at 21:15

## 2023-04-04 RX ADMIN — SODIUM CHLORIDE 2000 MILLILITER(S): 9 INJECTION INTRAMUSCULAR; INTRAVENOUS; SUBCUTANEOUS at 14:28

## 2023-04-04 RX ADMIN — Medication 400 MILLIGRAM(S): at 21:14

## 2023-04-04 RX ADMIN — GABAPENTIN 300 MILLIGRAM(S): 400 CAPSULE ORAL at 18:44

## 2023-04-04 RX ADMIN — SODIUM CHLORIDE 100 MILLILITER(S): 9 INJECTION, SOLUTION INTRAVENOUS at 16:57

## 2023-04-04 RX ADMIN — AMPICILLIN SODIUM AND SULBACTAM SODIUM 200 GRAM(S): 250; 125 INJECTION, POWDER, FOR SUSPENSION INTRAMUSCULAR; INTRAVENOUS at 16:57

## 2023-04-04 RX ADMIN — ZOLPIDEM TARTRATE 5 MILLIGRAM(S): 10 TABLET ORAL at 22:44

## 2023-04-04 RX ADMIN — SODIUM CHLORIDE 100 MILLILITER(S): 9 INJECTION, SOLUTION INTRAVENOUS at 18:44

## 2023-04-04 RX ADMIN — TETANUS AND DIPHTHERIA TOXOIDS ADSORBED 0.5 MILLILITER(S): 2; 2 INJECTION INTRAMUSCULAR at 18:42

## 2023-04-04 NOTE — ED PROVIDER NOTE - NS ED ATTENDING STATEMENT MOD
This was a shared visit with the CORTNEY. I reviewed and verified the documentation and independently performed the documented:
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
## 2023-04-04 NOTE — PATIENT PROFILE ADULT - FUNCTIONAL ASSESSMENT - DAILY ACTIVITY 2.
Chief complaint:   Chief Complaint   Patient presents with   • Cough     room 3       Vitals:  Visit Vitals  /76 (BP Location: [facility], Patient Position: Sitting, Cuff Size: Regular)   Pulse 86   Temp 97.7 °F (36.5 °C) (Temporal)   Resp 16   Ht 5' 6\" (1.676 m)   Wt 73.5 kg   LMP 06/08/2019 (Exact Date)   SpO2 97%   BMI 26.17 kg/m²       HISTORY OF PRESENT ILLNESS     Jaky Denton is a 42 year old female who presents for evaluation of wheezing and cough. Patient states that she started having a sore throat on Saturday, 3 days ago. Her cough began 2 days ago but worsened last night. She states the cough is dry and nonproductive. Last night she felt short of breath and heard a whistling sound. She states her cough is \"hot and low\" in her chest. Patient denies any shortness of breath currently. She does report some tightness in the chest at the time of her visit. She's been using Delsym at home which usually helps with her symptoms, she is not getting any relief from it now. She is a smoker, although she reports that she smokes \"very little\" and denies a history of asthma. Patient states that anytime she gets a cough that lasts a very long time and settles very deep in her chest. She is concerned today because she has a trip planned at the end of this week to visit her brother who is having heart surgery.      Other significant problems:  There are no active problems to display for this patient.      PAST MEDICAL, FAMILY AND SOCIAL HISTORY     Medications:  Current Outpatient Medications   Medication   • valACYclovir (VALTREX) 500 MG tablet   • ATARAX TABS 25 MG PO     No current facility-administered medications for this visit.        Allergies:  ALLERGIES:   Allergen Reactions   • Lactase   (Food Or Med)        Past Medical  History/Surgeries:  History reviewed. No pertinent past medical history.    History reviewed. No pertinent surgical history.    Family History:  Family History   Problem Relation Age of Onset   •  Diabetes Father    • Diabetes Paternal Grandmother    • Cancer, Breast Paternal Aunt        Social History:  Social History     Tobacco Use   • Smoking status: Current Some Day Smoker     Packs/day: 0.25     Types: Cigarettes   • Smokeless tobacco: Never Used   • Tobacco comment: Patient stated \"very little.\"   Substance Use Topics   • Alcohol use: Yes     Alcohol/week: 4.2 oz     Types: 7 Standard drinks or equivalent per week     Comment: Occasionally       REVIEW OF SYSTEMS     Review of Systems   Constitutional: Positive for chills, fatigue and fever (subjective, none recorded).   HENT: Positive for postnasal drip, rhinorrhea and sore throat.    Respiratory: Positive for cough, chest tightness and wheezing. Negative for shortness of breath.    Cardiovascular: Negative for chest pain, palpitations and leg swelling.   Gastrointestinal: Negative for diarrhea, nausea and vomiting.   Skin: Negative for rash.   Neurological: Positive for headaches. Negative for dizziness and light-headedness.   All other systems reviewed and are negative.      PHYSICAL EXAM     Physical Exam   Constitutional: She is oriented to person, place, and time. She appears well-developed and well-nourished. No distress.   HENT:   Head: Normocephalic.   Right Ear: Hearing normal. Tympanic membrane is not erythematous. No middle ear effusion.   Left Ear: Tympanic membrane is not erythematous.  No middle ear effusion.   Nose: Mucosal edema and rhinorrhea present. Right sinus exhibits no maxillary sinus tenderness. Left sinus exhibits no maxillary sinus tenderness.   Mouth/Throat: Uvula is midline. No oropharyngeal exudate, posterior oropharyngeal edema or posterior oropharyngeal erythema.   Eyes: Pupils are equal, round, and reactive to light. EOM are normal. Right eye exhibits no discharge. Left eye exhibits no discharge.   Neck: Normal range of motion. Neck supple.   Cardiovascular: Normal rate and regular rhythm.   No murmur  heard.  Pulmonary/Chest: Effort normal and breath sounds normal. She has no decreased breath sounds. She has no wheezes. She has no rales.   Dry cough throughout exam   Neurological: She is alert and oriented to person, place, and time.   Skin: Skin is warm and dry. No rash noted.   Psychiatric: She has a normal mood and affect. Her behavior is normal. Judgment and thought content normal.       ASSESSMENT/PLAN     Jaky was seen today for cough.    Diagnoses and all orders for this visit:    Cough  -     XR CHEST PA AND LATERAL; Future      - CXR is clear of acute infiltrate.   - Patient is advised that symptoms are due to acute bronchitis causing airway inflammation and cough/mucus production.  Given the duration of symptoms less than 2 weeks this is likely viral; patient agrees to symptomatic management.   - Will be prescribed prednisone, 40 mg daily for 4 days to decrease airway inflammation and bronchospasm. Purpose, proper use and side effects of the medication were reviewed in detail with the patient. Advised to always take with food or on a full stomach. Drink plenty of water while on this medication. No other anti-inflammatories such as ibuprofen or aleve while on prednisone.   - Use albuterol inhaler every 4-6 hours prn for shortness of breath. Proper use reviewed with patient. Advised that if inhaler ever does not relieve SOB, immediate re-evaluation is warranted.   - Tessalon perles for symptomatic relief of coughing.   - Can try OTC expectorants or cough suppressants to relieve coughing and chest congestion. Patient is advised not to take any medications containing pseudoephedrine or phenylephrine if history of high blood pressure.  - Drink plenty of fluids and rest until symptoms improve.  - Follow up with PCP if no improvement in 1-2 weeks of symptomatic treatment. Seek care sooner if high fever, worsening productive cough, SOB not relieved with inhalers, chest pain, palpitations, leg swelling or  dizziness.     FOLLOW-UP:  PCP as needed    PATIENT INSTRUCTIONS:  Attached in after visit summary.     The patient indicates understanding of the diagnosis and is agreeable with the plan of care. No further questions, comments, or concerns they wanted to address at today's visit.    Collaborating physician available today is Dr. Augustin Mendoza PA-C  06/12/19  12:46 PM         4 = No assist / stand by assistance
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
## 2023-04-04 NOTE — H&P ADULT - NSHPSOCIALHISTORY_GEN_ALL_CORE
previous ciggarette smoker, quit 3 years ago however does vape now  denies h/o drug abuse  ETOH - socially    lives in an apartment alone, son recently moved out
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
## 2023-04-04 NOTE — ED PROVIDER NOTE - PHYSICAL EXAMINATION
VITAL SIGNS: I have reviewed nursing notes and confirm.  CONSTITUTIONAL:  in no acute distress.  SKIN: Skin exam is warm and dry, blistering to right forearm on dorsal aspect of arm appears to be 2nd degree full thickness. no active discharge, no ttp, swelling to right hand. pulses intact, coin sized bur  HEAD: Normocephalic; atraumatic.  EYES: EOM intact; conjunctiva and sclera clear.  ENT: No nasal discharge; airway clear.   NECK: Supple; non tender.  CARD: S1, S2 normal; no murmurs, gallops, or rubs. Regular rate and rhythm.  RESP: No wheezes, rales or rhonchi. Speaking in full sentences.   ABD: soft; non-distended; non-tender  EXT: Normal ROM. No clubbing, cyanosis or edema.
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
## 2023-04-05 LAB
ALBUMIN SERPL ELPH-MCNC: 4.2 G/DL — SIGNIFICANT CHANGE UP (ref 3.5–5.2)
ALP SERPL-CCNC: 92 U/L — SIGNIFICANT CHANGE UP (ref 30–115)
ALT FLD-CCNC: 24 U/L — SIGNIFICANT CHANGE UP (ref 0–41)
ANION GAP SERPL CALC-SCNC: 12 MMOL/L — SIGNIFICANT CHANGE UP (ref 7–14)
AST SERPL-CCNC: 21 U/L — SIGNIFICANT CHANGE UP (ref 0–41)
BASOPHILS # BLD AUTO: 0.11 K/UL — SIGNIFICANT CHANGE UP (ref 0–0.2)
BASOPHILS NFR BLD AUTO: 1.3 % — HIGH (ref 0–1)
BILIRUB SERPL-MCNC: 0.7 MG/DL — SIGNIFICANT CHANGE UP (ref 0.2–1.2)
BUN SERPL-MCNC: 17 MG/DL — SIGNIFICANT CHANGE UP (ref 10–20)
CALCIUM SERPL-MCNC: 9.2 MG/DL — SIGNIFICANT CHANGE UP (ref 8.4–10.5)
CHLORIDE SERPL-SCNC: 103 MMOL/L — SIGNIFICANT CHANGE UP (ref 98–110)
CO2 SERPL-SCNC: 26 MMOL/L — SIGNIFICANT CHANGE UP (ref 17–32)
CREAT SERPL-MCNC: <0.5 MG/DL — LOW (ref 0.7–1.5)
EGFR: 115 ML/MIN/1.73M2 — SIGNIFICANT CHANGE UP
EOSINOPHIL # BLD AUTO: 0.36 K/UL — SIGNIFICANT CHANGE UP (ref 0–0.7)
EOSINOPHIL NFR BLD AUTO: 4.1 % — SIGNIFICANT CHANGE UP (ref 0–8)
GLUCOSE SERPL-MCNC: 101 MG/DL — HIGH (ref 70–99)
HCT VFR BLD CALC: 36.6 % — LOW (ref 37–47)
HCV AB S/CO SERPL IA: 0.03 COI — SIGNIFICANT CHANGE UP
HCV AB SERPL-IMP: SIGNIFICANT CHANGE UP
HGB BLD-MCNC: 12.4 G/DL — SIGNIFICANT CHANGE UP (ref 12–16)
IMM GRANULOCYTES NFR BLD AUTO: 0.5 % — HIGH (ref 0.1–0.3)
LYMPHOCYTES # BLD AUTO: 1.63 K/UL — SIGNIFICANT CHANGE UP (ref 1.2–3.4)
LYMPHOCYTES # BLD AUTO: 18.5 % — LOW (ref 20.5–51.1)
MAGNESIUM SERPL-MCNC: 2.7 MG/DL — HIGH (ref 1.8–2.4)
MCHC RBC-ENTMCNC: 30.7 PG — SIGNIFICANT CHANGE UP (ref 27–31)
MCHC RBC-ENTMCNC: 33.9 G/DL — SIGNIFICANT CHANGE UP (ref 32–37)
MCV RBC AUTO: 90.6 FL — SIGNIFICANT CHANGE UP (ref 81–99)
MONOCYTES # BLD AUTO: 0.73 K/UL — HIGH (ref 0.1–0.6)
MONOCYTES NFR BLD AUTO: 8.3 % — SIGNIFICANT CHANGE UP (ref 1.7–9.3)
NEUTROPHILS # BLD AUTO: 5.92 K/UL — SIGNIFICANT CHANGE UP (ref 1.4–6.5)
NEUTROPHILS NFR BLD AUTO: 67.3 % — SIGNIFICANT CHANGE UP (ref 42.2–75.2)
NRBC # BLD: 0 /100 WBCS — SIGNIFICANT CHANGE UP (ref 0–0)
PHOSPHATE SERPL-MCNC: 3.6 MG/DL — SIGNIFICANT CHANGE UP (ref 2.1–4.9)
PLATELET # BLD AUTO: 292 K/UL — SIGNIFICANT CHANGE UP (ref 130–400)
POTASSIUM SERPL-MCNC: 3.6 MMOL/L — SIGNIFICANT CHANGE UP (ref 3.5–5)
POTASSIUM SERPL-SCNC: 3.6 MMOL/L — SIGNIFICANT CHANGE UP (ref 3.5–5)
PROT SERPL-MCNC: 6.3 G/DL — SIGNIFICANT CHANGE UP (ref 6–8)
RBC # BLD: 4.04 M/UL — LOW (ref 4.2–5.4)
RBC # FLD: 12.1 % — SIGNIFICANT CHANGE UP (ref 11.5–14.5)
SODIUM SERPL-SCNC: 141 MMOL/L — SIGNIFICANT CHANGE UP (ref 135–146)
WBC # BLD: 8.79 K/UL — SIGNIFICANT CHANGE UP (ref 4.8–10.8)
WBC # FLD AUTO: 8.79 K/UL — SIGNIFICANT CHANGE UP (ref 4.8–10.8)

## 2023-04-05 PROCEDURE — 99232 SBSQ HOSP IP/OBS MODERATE 35: CPT

## 2023-04-05 RX ORDER — MAGNESIUM SULFATE 500 MG/ML
2 VIAL (ML) INJECTION ONCE
Refills: 0 | Status: COMPLETED | OUTPATIENT
Start: 2023-04-05 | End: 2023-04-05

## 2023-04-05 RX ORDER — POTASSIUM CHLORIDE 20 MEQ
20 PACKET (EA) ORAL ONCE
Refills: 0 | Status: COMPLETED | OUTPATIENT
Start: 2023-04-05 | End: 2023-04-05

## 2023-04-05 RX ORDER — ZOLPIDEM TARTRATE 10 MG/1
10 TABLET ORAL AT BEDTIME
Refills: 0 | Status: DISCONTINUED | OUTPATIENT
Start: 2023-04-05 | End: 2023-04-07

## 2023-04-05 RX ORDER — LANOLIN ALCOHOL/MO/W.PET/CERES
5 CREAM (GRAM) TOPICAL AT BEDTIME
Refills: 0 | Status: DISCONTINUED | OUTPATIENT
Start: 2023-04-05 | End: 2023-04-07

## 2023-04-05 RX ADMIN — GABAPENTIN 300 MILLIGRAM(S): 400 CAPSULE ORAL at 06:13

## 2023-04-05 RX ADMIN — ATENOLOL 100 MILLIGRAM(S): 25 TABLET ORAL at 06:12

## 2023-04-05 RX ADMIN — Medication 1 MILLIGRAM(S): at 20:38

## 2023-04-05 RX ADMIN — ZOLPIDEM TARTRATE 10 MILLIGRAM(S): 10 TABLET ORAL at 20:38

## 2023-04-05 RX ADMIN — Medication 50 MILLIEQUIVALENT(S): at 11:00

## 2023-04-05 RX ADMIN — ENOXAPARIN SODIUM 40 MILLIGRAM(S): 100 INJECTION SUBCUTANEOUS at 21:01

## 2023-04-05 RX ADMIN — AMLODIPINE BESYLATE 10 MILLIGRAM(S): 2.5 TABLET ORAL at 06:09

## 2023-04-05 RX ADMIN — AMPICILLIN SODIUM AND SULBACTAM SODIUM 200 GRAM(S): 250; 125 INJECTION, POWDER, FOR SUSPENSION INTRAMUSCULAR; INTRAVENOUS at 06:09

## 2023-04-05 RX ADMIN — AMPICILLIN SODIUM AND SULBACTAM SODIUM 200 GRAM(S): 250; 125 INJECTION, POWDER, FOR SUSPENSION INTRAMUSCULAR; INTRAVENOUS at 17:19

## 2023-04-05 RX ADMIN — AMPICILLIN SODIUM AND SULBACTAM SODIUM 200 GRAM(S): 250; 125 INJECTION, POWDER, FOR SUSPENSION INTRAMUSCULAR; INTRAVENOUS at 11:35

## 2023-04-05 RX ADMIN — Medication 5 MILLIGRAM(S): at 23:18

## 2023-04-05 RX ADMIN — Medication 25 GRAM(S): at 09:15

## 2023-04-05 RX ADMIN — Medication 400 MILLIGRAM(S): at 08:46

## 2023-04-05 RX ADMIN — Medication 400 MILLIGRAM(S): at 20:00

## 2023-04-05 RX ADMIN — Medication 400 MILLIGRAM(S): at 09:30

## 2023-04-05 RX ADMIN — RISPERIDONE 0.25 MILLIGRAM(S): 4 TABLET ORAL at 23:19

## 2023-04-05 RX ADMIN — Medication 1 APPLICATION(S): at 08:46

## 2023-04-05 RX ADMIN — AMPICILLIN SODIUM AND SULBACTAM SODIUM 200 GRAM(S): 250; 125 INJECTION, POWDER, FOR SUSPENSION INTRAMUSCULAR; INTRAVENOUS at 00:28

## 2023-04-05 RX ADMIN — Medication 400 MILLIGRAM(S): at 20:30

## 2023-04-05 RX ADMIN — SENNA PLUS 2 TABLET(S): 8.6 TABLET ORAL at 21:01

## 2023-04-05 RX ADMIN — GABAPENTIN 300 MILLIGRAM(S): 400 CAPSULE ORAL at 17:19
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
## 2023-04-06 LAB
ABO RH CONFIRMATION: SIGNIFICANT CHANGE UP
ALBUMIN SERPL ELPH-MCNC: 4.2 G/DL — SIGNIFICANT CHANGE UP (ref 3.5–5.2)
ALP SERPL-CCNC: 129 U/L — HIGH (ref 30–115)
ALT FLD-CCNC: 23 U/L — SIGNIFICANT CHANGE UP (ref 0–41)
ANION GAP SERPL CALC-SCNC: 13 MMOL/L — SIGNIFICANT CHANGE UP (ref 7–14)
APTT BLD: 37.7 SEC — SIGNIFICANT CHANGE UP (ref 27–39.2)
AST SERPL-CCNC: 19 U/L — SIGNIFICANT CHANGE UP (ref 0–41)
BASOPHILS # BLD AUTO: 0.1 K/UL — SIGNIFICANT CHANGE UP (ref 0–0.2)
BASOPHILS NFR BLD AUTO: 1.4 % — HIGH (ref 0–1)
BILIRUB SERPL-MCNC: 0.3 MG/DL — SIGNIFICANT CHANGE UP (ref 0.2–1.2)
BLD GP AB SCN SERPL QL: SIGNIFICANT CHANGE UP
BUN SERPL-MCNC: 18 MG/DL — SIGNIFICANT CHANGE UP (ref 10–20)
CALCIUM SERPL-MCNC: 9 MG/DL — SIGNIFICANT CHANGE UP (ref 8.4–10.5)
CHLORIDE SERPL-SCNC: 104 MMOL/L — SIGNIFICANT CHANGE UP (ref 98–110)
CO2 SERPL-SCNC: 24 MMOL/L — SIGNIFICANT CHANGE UP (ref 17–32)
CREAT SERPL-MCNC: <0.5 MG/DL — LOW (ref 0.7–1.5)
EGFR: 115 ML/MIN/1.73M2 — SIGNIFICANT CHANGE UP
EOSINOPHIL # BLD AUTO: 0.49 K/UL — SIGNIFICANT CHANGE UP (ref 0–0.7)
EOSINOPHIL NFR BLD AUTO: 6.9 % — SIGNIFICANT CHANGE UP (ref 0–8)
GLUCOSE SERPL-MCNC: 103 MG/DL — HIGH (ref 70–99)
HCG SERPL QL: NEGATIVE — SIGNIFICANT CHANGE UP
HCT VFR BLD CALC: 36.1 % — LOW (ref 37–47)
HGB BLD-MCNC: 12.1 G/DL — SIGNIFICANT CHANGE UP (ref 12–16)
IMM GRANULOCYTES NFR BLD AUTO: 0.6 % — HIGH (ref 0.1–0.3)
INR BLD: 0.95 RATIO — SIGNIFICANT CHANGE UP (ref 0.65–1.3)
LYMPHOCYTES # BLD AUTO: 1.66 K/UL — SIGNIFICANT CHANGE UP (ref 1.2–3.4)
LYMPHOCYTES # BLD AUTO: 23.2 % — SIGNIFICANT CHANGE UP (ref 20.5–51.1)
MAGNESIUM SERPL-MCNC: 1.8 MG/DL — SIGNIFICANT CHANGE UP (ref 1.8–2.4)
MCHC RBC-ENTMCNC: 30.6 PG — SIGNIFICANT CHANGE UP (ref 27–31)
MCHC RBC-ENTMCNC: 33.5 G/DL — SIGNIFICANT CHANGE UP (ref 32–37)
MCV RBC AUTO: 91.4 FL — SIGNIFICANT CHANGE UP (ref 81–99)
MONOCYTES # BLD AUTO: 0.82 K/UL — HIGH (ref 0.1–0.6)
MONOCYTES NFR BLD AUTO: 11.5 % — HIGH (ref 1.7–9.3)
NEUTROPHILS # BLD AUTO: 4.03 K/UL — SIGNIFICANT CHANGE UP (ref 1.4–6.5)
NEUTROPHILS NFR BLD AUTO: 56.4 % — SIGNIFICANT CHANGE UP (ref 42.2–75.2)
NRBC # BLD: 0 /100 WBCS — SIGNIFICANT CHANGE UP (ref 0–0)
PHOSPHATE SERPL-MCNC: 3.7 MG/DL — SIGNIFICANT CHANGE UP (ref 2.1–4.9)
PLATELET # BLD AUTO: 289 K/UL — SIGNIFICANT CHANGE UP (ref 130–400)
POTASSIUM SERPL-MCNC: 4.1 MMOL/L — SIGNIFICANT CHANGE UP (ref 3.5–5)
POTASSIUM SERPL-SCNC: 4.1 MMOL/L — SIGNIFICANT CHANGE UP (ref 3.5–5)
PROT SERPL-MCNC: 6.5 G/DL — SIGNIFICANT CHANGE UP (ref 6–8)
PROTHROM AB SERPL-ACNC: 10.8 SEC — SIGNIFICANT CHANGE UP (ref 9.95–12.87)
RBC # BLD: 3.95 M/UL — LOW (ref 4.2–5.4)
RBC # FLD: 12 % — SIGNIFICANT CHANGE UP (ref 11.5–14.5)
SODIUM SERPL-SCNC: 141 MMOL/L — SIGNIFICANT CHANGE UP (ref 135–146)
WBC # BLD: 7.14 K/UL — SIGNIFICANT CHANGE UP (ref 4.8–10.8)
WBC # FLD AUTO: 7.14 K/UL — SIGNIFICANT CHANGE UP (ref 4.8–10.8)

## 2023-04-06 PROCEDURE — 99232 SBSQ HOSP IP/OBS MODERATE 35: CPT

## 2023-04-06 RX ORDER — ASCORBIC ACID 60 MG
500 TABLET,CHEWABLE ORAL DAILY
Refills: 0 | Status: DISCONTINUED | OUTPATIENT
Start: 2023-04-06 | End: 2023-04-07

## 2023-04-06 RX ORDER — SODIUM CHLORIDE 9 MG/ML
1000 INJECTION, SOLUTION INTRAVENOUS
Refills: 0 | Status: DISCONTINUED | OUTPATIENT
Start: 2023-04-06 | End: 2023-04-07

## 2023-04-06 RX ORDER — COLLAGENASE CLOSTRIDIUM HIST. 250 UNIT/G
1 OINTMENT (GRAM) TOPICAL
Refills: 0 | Status: DISCONTINUED | OUTPATIENT
Start: 2023-04-06 | End: 2023-04-07

## 2023-04-06 RX ORDER — MULTIVIT-MIN/FERROUS GLUCONATE 9 MG/15 ML
1 LIQUID (ML) ORAL DAILY
Refills: 0 | Status: DISCONTINUED | OUTPATIENT
Start: 2023-04-06 | End: 2023-04-07

## 2023-04-06 RX ORDER — LANOLIN ALCOHOL/MO/W.PET/CERES
5 CREAM (GRAM) TOPICAL ONCE
Refills: 0 | Status: COMPLETED | OUTPATIENT
Start: 2023-04-06 | End: 2023-04-06

## 2023-04-06 RX ORDER — KETOROLAC TROMETHAMINE 30 MG/ML
30 SYRINGE (ML) INJECTION EVERY 6 HOURS
Refills: 0 | Status: DISCONTINUED | OUTPATIENT
Start: 2023-04-06 | End: 2023-04-07

## 2023-04-06 RX ORDER — LIDOCAINE 4 G/100G
1 CREAM TOPICAL EVERY 24 HOURS
Refills: 0 | Status: DISCONTINUED | OUTPATIENT
Start: 2023-04-06 | End: 2023-04-07

## 2023-04-06 RX ADMIN — AMPICILLIN SODIUM AND SULBACTAM SODIUM 200 GRAM(S): 250; 125 INJECTION, POWDER, FOR SUSPENSION INTRAMUSCULAR; INTRAVENOUS at 11:53

## 2023-04-06 RX ADMIN — AMPICILLIN SODIUM AND SULBACTAM SODIUM 200 GRAM(S): 250; 125 INJECTION, POWDER, FOR SUSPENSION INTRAMUSCULAR; INTRAVENOUS at 18:46

## 2023-04-06 RX ADMIN — Medication 30 MILLIGRAM(S): at 11:03

## 2023-04-06 RX ADMIN — LIDOCAINE 1 PATCH: 4 CREAM TOPICAL at 19:30

## 2023-04-06 RX ADMIN — Medication 1 MILLIGRAM(S): at 20:40

## 2023-04-06 RX ADMIN — Medication 500 MILLIGRAM(S): at 21:42

## 2023-04-06 RX ADMIN — Medication 30 MILLIGRAM(S): at 18:48

## 2023-04-06 RX ADMIN — RISPERIDONE 0.25 MILLIGRAM(S): 4 TABLET ORAL at 21:38

## 2023-04-06 RX ADMIN — Medication 30 MILLIGRAM(S): at 10:29

## 2023-04-06 RX ADMIN — Medication 1 APPLICATION(S): at 20:51

## 2023-04-06 RX ADMIN — Medication 5 MILLIGRAM(S): at 21:39

## 2023-04-06 RX ADMIN — GABAPENTIN 300 MILLIGRAM(S): 400 CAPSULE ORAL at 18:47

## 2023-04-06 RX ADMIN — AMLODIPINE BESYLATE 10 MILLIGRAM(S): 2.5 TABLET ORAL at 05:54

## 2023-04-06 RX ADMIN — Medication 1 TABLET(S): at 21:43

## 2023-04-06 RX ADMIN — Medication 5 MILLIGRAM(S): at 21:41

## 2023-04-06 RX ADMIN — GABAPENTIN 300 MILLIGRAM(S): 400 CAPSULE ORAL at 05:54

## 2023-04-06 RX ADMIN — AMPICILLIN SODIUM AND SULBACTAM SODIUM 200 GRAM(S): 250; 125 INJECTION, POWDER, FOR SUSPENSION INTRAMUSCULAR; INTRAVENOUS at 00:25

## 2023-04-06 RX ADMIN — ENOXAPARIN SODIUM 40 MILLIGRAM(S): 100 INJECTION SUBCUTANEOUS at 21:40

## 2023-04-06 RX ADMIN — AMPICILLIN SODIUM AND SULBACTAM SODIUM 200 GRAM(S): 250; 125 INJECTION, POWDER, FOR SUSPENSION INTRAMUSCULAR; INTRAVENOUS at 05:53

## 2023-04-06 RX ADMIN — Medication 30 MILLIGRAM(S): at 21:00

## 2023-04-06 RX ADMIN — Medication 30 MILLIGRAM(S): at 21:30

## 2023-04-06 RX ADMIN — ATENOLOL 100 MILLIGRAM(S): 25 TABLET ORAL at 05:54

## 2023-04-06 RX ADMIN — Medication 1 APPLICATION(S): at 11:49

## 2023-04-06 RX ADMIN — ZOLPIDEM TARTRATE 10 MILLIGRAM(S): 10 TABLET ORAL at 20:47

## 2023-04-06 RX ADMIN — Medication 30 MILLIGRAM(S): at 18:58

## 2023-04-06 RX ADMIN — LIDOCAINE 1 PATCH: 4 CREAM TOPICAL at 16:21

## 2023-04-06 RX ADMIN — Medication 1 MILLIGRAM(S): at 11:49
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
## 2023-04-06 NOTE — DIETITIAN INITIAL EVALUATION ADULT - PERTINENT MEDS FT
MEDICATIONS  (STANDING):  amLODIPine   Tablet 10 milliGRAM(s) Oral daily  ampicillin/sulbactam  IVPB 3 Gram(s) IV Intermittent every 6 hours  atenolol  Tablet 100 milliGRAM(s) Oral daily  chlorhexidine 2% Cloths 1 Application(s) Topical daily  enoxaparin Injectable 40 milliGRAM(s) SubCutaneous every 24 hours  gabapentin 300 milliGRAM(s) Oral two times a day  hydrochlorothiazide 25 milliGRAM(s) Oral daily  ketorolac   Injectable 30 milliGRAM(s) IV Push every 6 hours  melatonin 5 milliGRAM(s) Oral at bedtime  senna 2 Tablet(s) Oral daily    MEDICATIONS  (PRN):  ALPRAZolam 1 milliGRAM(s) Oral two times a day PRN for anxiety  bacitracin   Ointment 1 Application(s) Topical two times a day PRN wound care  morphine  - Injectable 2 milliGRAM(s) IV Push two times a day PRN wound care  oxycodone    5 mG/acetaminophen 325 mG 1 Tablet(s) Oral every 6 hours PRN Moderate Pain (4 - 6)  risperiDONE   Tablet 0.25 milliGRAM(s) Oral at bedtime PRN anxiety  zolpidem 10 milliGRAM(s) Oral at bedtime PRN Insomnia

## 2023-04-06 NOTE — DIETITIAN INITIAL EVALUATION ADULT - NSFNSGIIOFT_GEN_A_CORE
I&O's Detail    06 Apr 2023 07:01  -  06 Apr 2023 14:06  --------------------------------------------------------  IN:    IV PiggyBack: 100 mL  Total IN: 100 mL    OUT:  Total OUT: 0 mL    Total NET: 100 mL

## 2023-04-06 NOTE — DIETITIAN INITIAL EVALUATION ADULT - PERTINENT LABORATORY DATA
04-05    141  |  103  |  17  ----------------------------<  101<H>  3.6   |  26  |  <0.5<L>    Ca    9.2      05 Apr 2023 11:19  Phos  3.6     04-05  Mg     2.7     04-05    TPro  6.3  /  Alb  4.2  /  TBili  0.7  /  DBili  x   /  AST  21  /  ALT  24  /  AlkPhos  92  04-05

## 2023-04-06 NOTE — OCCUPATIONAL THERAPY INITIAL EVALUATION ADULT - PLANNED THERAPY INTERVENTIONS, OT EVAL
ADL retraining/fine motor coordination training/joint mobilization/orthotic fitting/training/ROM/stretching

## 2023-04-06 NOTE — OCCUPATIONAL THERAPY INITIAL EVALUATION ADULT - RANGE OF MOTION EXAMINATION
R UE: Forearm 3/4 AROM pronation/ supination, wrist extension 3/4 AROM, digits 2-5 3/4 AROM flexion/no Passive ROM deficits were identified
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
## 2023-04-06 NOTE — OCCUPATIONAL THERAPY INITIAL EVALUATION ADULT - NSACTIVITYREC_GEN_A_OT
Patient requires assistance with activities of daily living. OT recommends D/C to home with outpatient OT when medically appropriate. Refer to evaluation/treatment for details.
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
## 2023-04-06 NOTE — DIETITIAN INITIAL EVALUATION ADULT - ORAL INTAKE PTA/DIET HISTORY
Hx obtained from pt at bedside. Reports good PO intake PTA. No food allergy/intolerance. No therapeutic diet or dietary restrictions. No supplement use. No chewing/swallowing issues. UBW 127lbs and reports 37lb weight gain over the past 6 months as she started eating out more, and the food she orders is not always healthy. Height is 5'5".
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
## 2023-04-06 NOTE — DIETITIAN INITIAL EVALUATION ADULT - NS FNS DIET ORDER
Diet, NPO after Midnight:      NPO Start Date: 06-Apr-2023,   NPO Start Time: 23:59 (04-06-23 @ 11:45)

## 2023-04-07 ENCOUNTER — TRANSCRIPTION ENCOUNTER (OUTPATIENT)
Age: 58
End: 2023-04-07

## 2023-04-07 LAB
ALBUMIN SERPL ELPH-MCNC: 4.2 G/DL — SIGNIFICANT CHANGE UP (ref 3.5–5.2)
ALP SERPL-CCNC: 90 U/L — SIGNIFICANT CHANGE UP (ref 30–115)
ALT FLD-CCNC: 24 U/L — SIGNIFICANT CHANGE UP (ref 0–41)
ANION GAP SERPL CALC-SCNC: 10 MMOL/L — SIGNIFICANT CHANGE UP (ref 7–14)
AST SERPL-CCNC: 21 U/L — SIGNIFICANT CHANGE UP (ref 0–41)
BASOPHILS # BLD AUTO: 0.08 K/UL — SIGNIFICANT CHANGE UP (ref 0–0.2)
BASOPHILS NFR BLD AUTO: 1.4 % — HIGH (ref 0–1)
BILIRUB SERPL-MCNC: 0.6 MG/DL — SIGNIFICANT CHANGE UP (ref 0.2–1.2)
BUN SERPL-MCNC: 18 MG/DL — SIGNIFICANT CHANGE UP (ref 10–20)
CALCIUM SERPL-MCNC: 9.5 MG/DL — SIGNIFICANT CHANGE UP (ref 8.4–10.5)
CHLORIDE SERPL-SCNC: 107 MMOL/L — SIGNIFICANT CHANGE UP (ref 98–110)
CO2 SERPL-SCNC: 26 MMOL/L — SIGNIFICANT CHANGE UP (ref 17–32)
CREAT SERPL-MCNC: <0.5 MG/DL — LOW (ref 0.7–1.5)
EGFR: 115 ML/MIN/1.73M2 — SIGNIFICANT CHANGE UP
EOSINOPHIL # BLD AUTO: 0.4 K/UL — SIGNIFICANT CHANGE UP (ref 0–0.7)
EOSINOPHIL NFR BLD AUTO: 6.8 % — SIGNIFICANT CHANGE UP (ref 0–8)
GLUCOSE SERPL-MCNC: 89 MG/DL — SIGNIFICANT CHANGE UP (ref 70–99)
HCT VFR BLD CALC: 36.6 % — LOW (ref 37–47)
HGB BLD-MCNC: 12.4 G/DL — SIGNIFICANT CHANGE UP (ref 12–16)
IMM GRANULOCYTES NFR BLD AUTO: 0.3 % — SIGNIFICANT CHANGE UP (ref 0.1–0.3)
LYMPHOCYTES # BLD AUTO: 1.56 K/UL — SIGNIFICANT CHANGE UP (ref 1.2–3.4)
LYMPHOCYTES # BLD AUTO: 26.6 % — SIGNIFICANT CHANGE UP (ref 20.5–51.1)
MAGNESIUM SERPL-MCNC: 1.7 MG/DL — LOW (ref 1.8–2.4)
MCHC RBC-ENTMCNC: 30.8 PG — SIGNIFICANT CHANGE UP (ref 27–31)
MCHC RBC-ENTMCNC: 33.9 G/DL — SIGNIFICANT CHANGE UP (ref 32–37)
MCV RBC AUTO: 90.8 FL — SIGNIFICANT CHANGE UP (ref 81–99)
MONOCYTES # BLD AUTO: 0.5 K/UL — SIGNIFICANT CHANGE UP (ref 0.1–0.6)
MONOCYTES NFR BLD AUTO: 8.5 % — SIGNIFICANT CHANGE UP (ref 1.7–9.3)
NEUTROPHILS # BLD AUTO: 3.3 K/UL — SIGNIFICANT CHANGE UP (ref 1.4–6.5)
NEUTROPHILS NFR BLD AUTO: 56.4 % — SIGNIFICANT CHANGE UP (ref 42.2–75.2)
NRBC # BLD: 0 /100 WBCS — SIGNIFICANT CHANGE UP (ref 0–0)
PHOSPHATE SERPL-MCNC: 4 MG/DL — SIGNIFICANT CHANGE UP (ref 2.1–4.9)
PLATELET # BLD AUTO: 287 K/UL — SIGNIFICANT CHANGE UP (ref 130–400)
POTASSIUM SERPL-MCNC: 4.5 MMOL/L — SIGNIFICANT CHANGE UP (ref 3.5–5)
POTASSIUM SERPL-SCNC: 4.5 MMOL/L — SIGNIFICANT CHANGE UP (ref 3.5–5)
PROT SERPL-MCNC: 6.4 G/DL — SIGNIFICANT CHANGE UP (ref 6–8)
RBC # BLD: 4.03 M/UL — LOW (ref 4.2–5.4)
RBC # FLD: 12.1 % — SIGNIFICANT CHANGE UP (ref 11.5–14.5)
SODIUM SERPL-SCNC: 143 MMOL/L — SIGNIFICANT CHANGE UP (ref 135–146)
WBC # BLD: 5.86 K/UL — SIGNIFICANT CHANGE UP (ref 4.8–10.8)
WBC # FLD AUTO: 5.86 K/UL — SIGNIFICANT CHANGE UP (ref 4.8–10.8)

## 2023-04-07 PROCEDURE — 16020 DRESS/DEBRID P-THICK BURN S: CPT

## 2023-04-07 PROCEDURE — 88304 TISSUE EXAM BY PATHOLOGIST: CPT | Mod: 26

## 2023-04-07 RX ORDER — ENOXAPARIN SODIUM 100 MG/ML
40 INJECTION SUBCUTANEOUS EVERY 24 HOURS
Refills: 0 | Status: DISCONTINUED | OUTPATIENT
Start: 2023-04-07 | End: 2023-04-12

## 2023-04-07 RX ORDER — ONDANSETRON 8 MG/1
4 TABLET, FILM COATED ORAL ONCE
Refills: 0 | Status: COMPLETED | OUTPATIENT
Start: 2023-04-07 | End: 2023-04-07

## 2023-04-07 RX ORDER — LANOLIN ALCOHOL/MO/W.PET/CERES
5 CREAM (GRAM) TOPICAL ONCE
Refills: 0 | Status: COMPLETED | OUTPATIENT
Start: 2023-04-07 | End: 2023-04-07

## 2023-04-07 RX ORDER — MORPHINE SULFATE 50 MG/1
2 CAPSULE, EXTENDED RELEASE ORAL
Refills: 0 | Status: DISCONTINUED | OUTPATIENT
Start: 2023-04-07 | End: 2023-04-11

## 2023-04-07 RX ORDER — SODIUM CHLORIDE 9 MG/ML
1000 INJECTION, SOLUTION INTRAVENOUS
Refills: 0 | Status: DISCONTINUED | OUTPATIENT
Start: 2023-04-07 | End: 2023-04-07

## 2023-04-07 RX ORDER — GABAPENTIN 400 MG/1
300 CAPSULE ORAL
Refills: 0 | Status: DISCONTINUED | OUTPATIENT
Start: 2023-04-07 | End: 2023-04-12

## 2023-04-07 RX ORDER — LIDOCAINE 4 G/100G
1 CREAM TOPICAL EVERY 24 HOURS
Refills: 0 | Status: DISCONTINUED | OUTPATIENT
Start: 2023-04-07 | End: 2023-04-12

## 2023-04-07 RX ORDER — COLLAGENASE CLOSTRIDIUM HIST. 250 UNIT/G
1 OINTMENT (GRAM) TOPICAL
Refills: 0 | Status: DISCONTINUED | OUTPATIENT
Start: 2023-04-07 | End: 2023-04-07

## 2023-04-07 RX ORDER — AMLODIPINE BESYLATE 2.5 MG/1
10 TABLET ORAL DAILY
Refills: 0 | Status: DISCONTINUED | OUTPATIENT
Start: 2023-04-07 | End: 2023-04-12

## 2023-04-07 RX ORDER — ALPRAZOLAM 0.25 MG
1 TABLET ORAL
Refills: 0 | Status: DISCONTINUED | OUTPATIENT
Start: 2023-04-07 | End: 2023-04-12

## 2023-04-07 RX ORDER — RISPERIDONE 4 MG/1
0.25 TABLET ORAL AT BEDTIME
Refills: 0 | Status: DISCONTINUED | OUTPATIENT
Start: 2023-04-07 | End: 2023-04-12

## 2023-04-07 RX ORDER — SENNA PLUS 8.6 MG/1
2 TABLET ORAL DAILY
Refills: 0 | Status: DISCONTINUED | OUTPATIENT
Start: 2023-04-07 | End: 2023-04-12

## 2023-04-07 RX ORDER — ASCORBIC ACID 60 MG
500 TABLET,CHEWABLE ORAL DAILY
Refills: 0 | Status: DISCONTINUED | OUTPATIENT
Start: 2023-04-07 | End: 2023-04-12

## 2023-04-07 RX ORDER — HYDROMORPHONE HYDROCHLORIDE 2 MG/ML
1 INJECTION INTRAMUSCULAR; INTRAVENOUS; SUBCUTANEOUS
Refills: 0 | Status: DISCONTINUED | OUTPATIENT
Start: 2023-04-07 | End: 2023-04-07

## 2023-04-07 RX ORDER — MEPERIDINE HYDROCHLORIDE 50 MG/ML
12.5 INJECTION INTRAMUSCULAR; INTRAVENOUS; SUBCUTANEOUS
Refills: 0 | Status: DISCONTINUED | OUTPATIENT
Start: 2023-04-07 | End: 2023-04-07

## 2023-04-07 RX ORDER — HYDROMORPHONE HYDROCHLORIDE 2 MG/ML
0.5 INJECTION INTRAMUSCULAR; INTRAVENOUS; SUBCUTANEOUS
Refills: 0 | Status: DISCONTINUED | OUTPATIENT
Start: 2023-04-07 | End: 2023-04-07

## 2023-04-07 RX ORDER — AMPICILLIN SODIUM AND SULBACTAM SODIUM 250; 125 MG/ML; MG/ML
3 INJECTION, POWDER, FOR SUSPENSION INTRAMUSCULAR; INTRAVENOUS EVERY 6 HOURS
Refills: 0 | Status: DISCONTINUED | OUTPATIENT
Start: 2023-04-07 | End: 2023-04-12

## 2023-04-07 RX ORDER — ZOLPIDEM TARTRATE 10 MG/1
10 TABLET ORAL AT BEDTIME
Refills: 0 | Status: DISCONTINUED | OUTPATIENT
Start: 2023-04-07 | End: 2023-04-12

## 2023-04-07 RX ORDER — HYDROXYZINE HCL 10 MG
50 TABLET ORAL ONCE
Refills: 0 | Status: COMPLETED | OUTPATIENT
Start: 2023-04-07 | End: 2023-04-07

## 2023-04-07 RX ORDER — ATENOLOL 25 MG/1
100 TABLET ORAL DAILY
Refills: 0 | Status: DISCONTINUED | OUTPATIENT
Start: 2023-04-07 | End: 2023-04-12

## 2023-04-07 RX ORDER — LANOLIN ALCOHOL/MO/W.PET/CERES
5 CREAM (GRAM) TOPICAL AT BEDTIME
Refills: 0 | Status: DISCONTINUED | OUTPATIENT
Start: 2023-04-07 | End: 2023-04-12

## 2023-04-07 RX ORDER — COLLAGENASE CLOSTRIDIUM HIST. 250 UNIT/G
1 OINTMENT (GRAM) TOPICAL
Refills: 0 | Status: DISCONTINUED | OUTPATIENT
Start: 2023-04-07 | End: 2023-04-10

## 2023-04-07 RX ORDER — KETOROLAC TROMETHAMINE 30 MG/ML
30 SYRINGE (ML) INJECTION EVERY 6 HOURS
Refills: 0 | Status: DISCONTINUED | OUTPATIENT
Start: 2023-04-07 | End: 2023-04-10

## 2023-04-07 RX ORDER — MULTIVIT-MIN/FERROUS GLUCONATE 9 MG/15 ML
1 LIQUID (ML) ORAL DAILY
Refills: 0 | Status: DISCONTINUED | OUTPATIENT
Start: 2023-04-07 | End: 2023-04-12

## 2023-04-07 RX ORDER — MAGNESIUM SULFATE 500 MG/ML
2 VIAL (ML) INJECTION ONCE
Refills: 0 | Status: COMPLETED | OUTPATIENT
Start: 2023-04-07 | End: 2023-04-07

## 2023-04-07 RX ORDER — SODIUM CHLORIDE 9 MG/ML
1000 INJECTION, SOLUTION INTRAVENOUS
Refills: 0 | Status: DISCONTINUED | OUTPATIENT
Start: 2023-04-07 | End: 2023-04-08

## 2023-04-07 RX ADMIN — Medication 30 MILLIGRAM(S): at 11:50

## 2023-04-07 RX ADMIN — ZOLPIDEM TARTRATE 10 MILLIGRAM(S): 10 TABLET ORAL at 18:25

## 2023-04-07 RX ADMIN — ONDANSETRON 4 MILLIGRAM(S): 8 TABLET, FILM COATED ORAL at 17:00

## 2023-04-07 RX ADMIN — Medication 30 MILLIGRAM(S): at 12:12

## 2023-04-07 RX ADMIN — ATENOLOL 100 MILLIGRAM(S): 25 TABLET ORAL at 05:42

## 2023-04-07 RX ADMIN — Medication 30 MILLIGRAM(S): at 23:54

## 2023-04-07 RX ADMIN — AMPICILLIN SODIUM AND SULBACTAM SODIUM 200 GRAM(S): 250; 125 INJECTION, POWDER, FOR SUSPENSION INTRAMUSCULAR; INTRAVENOUS at 11:53

## 2023-04-07 RX ADMIN — AMPICILLIN SODIUM AND SULBACTAM SODIUM 200 GRAM(S): 250; 125 INJECTION, POWDER, FOR SUSPENSION INTRAMUSCULAR; INTRAVENOUS at 00:24

## 2023-04-07 RX ADMIN — GABAPENTIN 300 MILLIGRAM(S): 400 CAPSULE ORAL at 18:25

## 2023-04-07 RX ADMIN — LIDOCAINE 1 PATCH: 4 CREAM TOPICAL at 06:00

## 2023-04-07 RX ADMIN — Medication 5 MILLIGRAM(S): at 21:19

## 2023-04-07 RX ADMIN — Medication 1 TABLET(S): at 11:51

## 2023-04-07 RX ADMIN — AMPICILLIN SODIUM AND SULBACTAM SODIUM 200 GRAM(S): 250; 125 INJECTION, POWDER, FOR SUSPENSION INTRAMUSCULAR; INTRAVENOUS at 23:53

## 2023-04-07 RX ADMIN — Medication 25 GRAM(S): at 19:52

## 2023-04-07 RX ADMIN — AMLODIPINE BESYLATE 10 MILLIGRAM(S): 2.5 TABLET ORAL at 08:35

## 2023-04-07 RX ADMIN — GABAPENTIN 300 MILLIGRAM(S): 400 CAPSULE ORAL at 05:30

## 2023-04-07 RX ADMIN — SODIUM CHLORIDE 75 MILLILITER(S): 9 INJECTION, SOLUTION INTRAVENOUS at 00:24

## 2023-04-07 RX ADMIN — Medication 50 MILLIGRAM(S): at 21:19

## 2023-04-07 RX ADMIN — Medication 30 MILLIGRAM(S): at 18:25

## 2023-04-07 RX ADMIN — Medication 1 MILLIGRAM(S): at 21:18

## 2023-04-07 RX ADMIN — RISPERIDONE 0.25 MILLIGRAM(S): 4 TABLET ORAL at 18:26

## 2023-04-07 RX ADMIN — Medication 30 MILLIGRAM(S): at 06:00

## 2023-04-07 RX ADMIN — AMPICILLIN SODIUM AND SULBACTAM SODIUM 200 GRAM(S): 250; 125 INJECTION, POWDER, FOR SUSPENSION INTRAMUSCULAR; INTRAVENOUS at 18:26

## 2023-04-07 RX ADMIN — ENOXAPARIN SODIUM 40 MILLIGRAM(S): 100 INJECTION SUBCUTANEOUS at 18:25

## 2023-04-07 RX ADMIN — Medication 30 MILLIGRAM(S): at 05:30

## 2023-04-07 RX ADMIN — Medication 500 MILLIGRAM(S): at 11:51

## 2023-04-07 RX ADMIN — AMPICILLIN SODIUM AND SULBACTAM SODIUM 200 GRAM(S): 250; 125 INJECTION, POWDER, FOR SUSPENSION INTRAMUSCULAR; INTRAVENOUS at 05:30
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
## 2023-04-07 NOTE — CHART NOTE - NSCHARTNOTEFT_GEN_A_CORE
PACU ANESTHESIA ADMISSION NOTE      Procedure:   Post op diagnosis:      ____  Intubated  TV:______       Rate: ______      FiO2: ______    __x__  Patent Airway    __x__  Full return of protective reflexes    __x__  Full recovery from anesthesia / back to baseline     Vitals:   T: 36          R: 20                 BP: 126/99                 Sat:   98                P: 77      Mental Status:  __x__ Awake   ___x__ Alert   _____ Drowsy   _____ Sedated    Nausea/Vomiting:  _x___ NO  ______Yes,   See Post - Op Orders          Pain Scale (0-10):  _____    Treatment: __x__ None    ____ See Post - Op/PCA Orders    Post - Operative Fluids:   ____ Oral   ___x_ See Post - Op Orders    Plan: Discharge:   ____Home       ___x__Floor     _____Critical Care    _____  Other:_________________    Comments:    Uneventful anesthesia. Patient transported to  spontaneously breathing and hemodynamically stable.
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
## 2023-04-08 LAB
ALBUMIN SERPL ELPH-MCNC: 4 G/DL — SIGNIFICANT CHANGE UP (ref 3.5–5.2)
ALP SERPL-CCNC: 104 U/L — SIGNIFICANT CHANGE UP (ref 30–115)
ALT FLD-CCNC: 28 U/L — SIGNIFICANT CHANGE UP (ref 0–41)
ANION GAP SERPL CALC-SCNC: 12 MMOL/L — SIGNIFICANT CHANGE UP (ref 7–14)
AST SERPL-CCNC: 28 U/L — SIGNIFICANT CHANGE UP (ref 0–41)
BASOPHILS # BLD AUTO: 0.08 K/UL — SIGNIFICANT CHANGE UP (ref 0–0.2)
BASOPHILS NFR BLD AUTO: 1.2 % — HIGH (ref 0–1)
BILIRUB SERPL-MCNC: 0.2 MG/DL — SIGNIFICANT CHANGE UP (ref 0.2–1.2)
BUN SERPL-MCNC: 18 MG/DL — SIGNIFICANT CHANGE UP (ref 10–20)
CALCIUM SERPL-MCNC: 9.2 MG/DL — SIGNIFICANT CHANGE UP (ref 8.4–10.5)
CHLORIDE SERPL-SCNC: 107 MMOL/L — SIGNIFICANT CHANGE UP (ref 98–110)
CO2 SERPL-SCNC: 27 MMOL/L — SIGNIFICANT CHANGE UP (ref 17–32)
CREAT SERPL-MCNC: <0.5 MG/DL — LOW (ref 0.7–1.5)
EGFR: 115 ML/MIN/1.73M2 — SIGNIFICANT CHANGE UP
EOSINOPHIL # BLD AUTO: 0.3 K/UL — SIGNIFICANT CHANGE UP (ref 0–0.7)
EOSINOPHIL NFR BLD AUTO: 4.6 % — SIGNIFICANT CHANGE UP (ref 0–8)
GLUCOSE SERPL-MCNC: 98 MG/DL — SIGNIFICANT CHANGE UP (ref 70–99)
HCT VFR BLD CALC: 33.2 % — LOW (ref 37–47)
HGB BLD-MCNC: 11.3 G/DL — LOW (ref 12–16)
IMM GRANULOCYTES NFR BLD AUTO: 0.3 % — SIGNIFICANT CHANGE UP (ref 0.1–0.3)
LYMPHOCYTES # BLD AUTO: 1.71 K/UL — SIGNIFICANT CHANGE UP (ref 1.2–3.4)
LYMPHOCYTES # BLD AUTO: 26.4 % — SIGNIFICANT CHANGE UP (ref 20.5–51.1)
MAGNESIUM SERPL-MCNC: 1.8 MG/DL — SIGNIFICANT CHANGE UP (ref 1.8–2.4)
MCHC RBC-ENTMCNC: 30.4 PG — SIGNIFICANT CHANGE UP (ref 27–31)
MCHC RBC-ENTMCNC: 34 G/DL — SIGNIFICANT CHANGE UP (ref 32–37)
MCV RBC AUTO: 89.2 FL — SIGNIFICANT CHANGE UP (ref 81–99)
MONOCYTES # BLD AUTO: 0.52 K/UL — SIGNIFICANT CHANGE UP (ref 0.1–0.6)
MONOCYTES NFR BLD AUTO: 8 % — SIGNIFICANT CHANGE UP (ref 1.7–9.3)
NEUTROPHILS # BLD AUTO: 3.84 K/UL — SIGNIFICANT CHANGE UP (ref 1.4–6.5)
NEUTROPHILS NFR BLD AUTO: 59.5 % — SIGNIFICANT CHANGE UP (ref 42.2–75.2)
NRBC # BLD: 0 /100 WBCS — SIGNIFICANT CHANGE UP (ref 0–0)
PHOSPHATE SERPL-MCNC: 4.1 MG/DL — SIGNIFICANT CHANGE UP (ref 2.1–4.9)
PLATELET # BLD AUTO: 268 K/UL — SIGNIFICANT CHANGE UP (ref 130–400)
POTASSIUM SERPL-MCNC: 3.8 MMOL/L — SIGNIFICANT CHANGE UP (ref 3.5–5)
POTASSIUM SERPL-SCNC: 3.8 MMOL/L — SIGNIFICANT CHANGE UP (ref 3.5–5)
PROT SERPL-MCNC: 6.1 G/DL — SIGNIFICANT CHANGE UP (ref 6–8)
RBC # BLD: 3.72 M/UL — LOW (ref 4.2–5.4)
RBC # FLD: 12.2 % — SIGNIFICANT CHANGE UP (ref 11.5–14.5)
SODIUM SERPL-SCNC: 146 MMOL/L — SIGNIFICANT CHANGE UP (ref 135–146)
WBC # BLD: 6.47 K/UL — SIGNIFICANT CHANGE UP (ref 4.8–10.8)
WBC # FLD AUTO: 6.47 K/UL — SIGNIFICANT CHANGE UP (ref 4.8–10.8)

## 2023-04-08 PROCEDURE — 99231 SBSQ HOSP IP/OBS SF/LOW 25: CPT

## 2023-04-08 RX ORDER — HYDROXYZINE HCL 10 MG
25 TABLET ORAL ONCE
Refills: 0 | Status: COMPLETED | OUTPATIENT
Start: 2023-04-08 | End: 2023-04-08

## 2023-04-08 RX ADMIN — LIDOCAINE 1 PATCH: 4 CREAM TOPICAL at 15:06

## 2023-04-08 RX ADMIN — AMPICILLIN SODIUM AND SULBACTAM SODIUM 200 GRAM(S): 250; 125 INJECTION, POWDER, FOR SUSPENSION INTRAMUSCULAR; INTRAVENOUS at 17:47

## 2023-04-08 RX ADMIN — Medication 30 MILLIGRAM(S): at 15:45

## 2023-04-08 RX ADMIN — GABAPENTIN 300 MILLIGRAM(S): 400 CAPSULE ORAL at 06:40

## 2023-04-08 RX ADMIN — Medication 30 MILLIGRAM(S): at 21:20

## 2023-04-08 RX ADMIN — ENOXAPARIN SODIUM 40 MILLIGRAM(S): 100 INJECTION SUBCUTANEOUS at 17:47

## 2023-04-08 RX ADMIN — SODIUM CHLORIDE 75 MILLILITER(S): 9 INJECTION, SOLUTION INTRAVENOUS at 15:06

## 2023-04-08 RX ADMIN — AMPICILLIN SODIUM AND SULBACTAM SODIUM 200 GRAM(S): 250; 125 INJECTION, POWDER, FOR SUSPENSION INTRAMUSCULAR; INTRAVENOUS at 23:27

## 2023-04-08 RX ADMIN — Medication 1 MILLIGRAM(S): at 21:04

## 2023-04-08 RX ADMIN — Medication 500 MILLIGRAM(S): at 12:57

## 2023-04-08 RX ADMIN — Medication 30 MILLIGRAM(S): at 09:13

## 2023-04-08 RX ADMIN — AMPICILLIN SODIUM AND SULBACTAM SODIUM 200 GRAM(S): 250; 125 INJECTION, POWDER, FOR SUSPENSION INTRAMUSCULAR; INTRAVENOUS at 05:30

## 2023-04-08 RX ADMIN — RISPERIDONE 0.25 MILLIGRAM(S): 4 TABLET ORAL at 00:01

## 2023-04-08 RX ADMIN — AMLODIPINE BESYLATE 10 MILLIGRAM(S): 2.5 TABLET ORAL at 08:30

## 2023-04-08 RX ADMIN — Medication 1 APPLICATION(S): at 09:16

## 2023-04-08 RX ADMIN — SENNA PLUS 2 TABLET(S): 8.6 TABLET ORAL at 21:04

## 2023-04-08 RX ADMIN — Medication 25 MILLIGRAM(S): at 22:26

## 2023-04-08 RX ADMIN — Medication 30 MILLIGRAM(S): at 20:51

## 2023-04-08 RX ADMIN — AMPICILLIN SODIUM AND SULBACTAM SODIUM 200 GRAM(S): 250; 125 INJECTION, POWDER, FOR SUSPENSION INTRAMUSCULAR; INTRAVENOUS at 11:45

## 2023-04-08 RX ADMIN — ATENOLOL 100 MILLIGRAM(S): 25 TABLET ORAL at 07:13

## 2023-04-08 RX ADMIN — Medication 30 MILLIGRAM(S): at 08:30

## 2023-04-08 RX ADMIN — Medication 1 TABLET(S): at 12:57

## 2023-04-08 RX ADMIN — Medication 30 MILLIGRAM(S): at 15:05

## 2023-04-08 RX ADMIN — GABAPENTIN 300 MILLIGRAM(S): 400 CAPSULE ORAL at 17:47

## 2023-04-08 RX ADMIN — Medication 5 MILLIGRAM(S): at 21:04

## 2023-04-08 RX ADMIN — Medication 1 APPLICATION(S): at 20:50

## 2023-04-08 RX ADMIN — ZOLPIDEM TARTRATE 10 MILLIGRAM(S): 10 TABLET ORAL at 20:51

## 2023-04-08 RX ADMIN — LIDOCAINE 1 PATCH: 4 CREAM TOPICAL at 20:56
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
## 2023-04-09 ENCOUNTER — TRANSCRIPTION ENCOUNTER (OUTPATIENT)
Age: 58
End: 2023-04-09

## 2023-04-09 LAB
ALBUMIN SERPL ELPH-MCNC: 3.9 G/DL — SIGNIFICANT CHANGE UP (ref 3.5–5.2)
ALP SERPL-CCNC: 84 U/L — SIGNIFICANT CHANGE UP (ref 30–115)
ALT FLD-CCNC: 33 U/L — SIGNIFICANT CHANGE UP (ref 0–41)
ANION GAP SERPL CALC-SCNC: 8 MMOL/L — SIGNIFICANT CHANGE UP (ref 7–14)
AST SERPL-CCNC: 28 U/L — SIGNIFICANT CHANGE UP (ref 0–41)
BASOPHILS # BLD AUTO: 0.08 K/UL — SIGNIFICANT CHANGE UP (ref 0–0.2)
BASOPHILS NFR BLD AUTO: 1.5 % — HIGH (ref 0–1)
BILIRUB SERPL-MCNC: 0.5 MG/DL — SIGNIFICANT CHANGE UP (ref 0.2–1.2)
BUN SERPL-MCNC: 24 MG/DL — HIGH (ref 10–20)
CALCIUM SERPL-MCNC: 9 MG/DL — SIGNIFICANT CHANGE UP (ref 8.4–10.5)
CHLORIDE SERPL-SCNC: 107 MMOL/L — SIGNIFICANT CHANGE UP (ref 98–110)
CO2 SERPL-SCNC: 30 MMOL/L — SIGNIFICANT CHANGE UP (ref 17–32)
CREAT SERPL-MCNC: 0.6 MG/DL — LOW (ref 0.7–1.5)
EGFR: 105 ML/MIN/1.73M2 — SIGNIFICANT CHANGE UP
EOSINOPHIL # BLD AUTO: 0.32 K/UL — SIGNIFICANT CHANGE UP (ref 0–0.7)
EOSINOPHIL NFR BLD AUTO: 6.2 % — SIGNIFICANT CHANGE UP (ref 0–8)
GLUCOSE SERPL-MCNC: 88 MG/DL — SIGNIFICANT CHANGE UP (ref 70–99)
HCT VFR BLD CALC: 33.1 % — LOW (ref 37–47)
HGB BLD-MCNC: 11.3 G/DL — LOW (ref 12–16)
IMM GRANULOCYTES NFR BLD AUTO: 0.4 % — HIGH (ref 0.1–0.3)
LYMPHOCYTES # BLD AUTO: 1.48 K/UL — SIGNIFICANT CHANGE UP (ref 1.2–3.4)
LYMPHOCYTES # BLD AUTO: 28.5 % — SIGNIFICANT CHANGE UP (ref 20.5–51.1)
MAGNESIUM SERPL-MCNC: 1.6 MG/DL — LOW (ref 1.8–2.4)
MCHC RBC-ENTMCNC: 31 PG — SIGNIFICANT CHANGE UP (ref 27–31)
MCHC RBC-ENTMCNC: 34.1 G/DL — SIGNIFICANT CHANGE UP (ref 32–37)
MCV RBC AUTO: 90.7 FL — SIGNIFICANT CHANGE UP (ref 81–99)
MONOCYTES # BLD AUTO: 0.49 K/UL — SIGNIFICANT CHANGE UP (ref 0.1–0.6)
MONOCYTES NFR BLD AUTO: 9.4 % — HIGH (ref 1.7–9.3)
NEUTROPHILS # BLD AUTO: 2.81 K/UL — SIGNIFICANT CHANGE UP (ref 1.4–6.5)
NEUTROPHILS NFR BLD AUTO: 54 % — SIGNIFICANT CHANGE UP (ref 42.2–75.2)
NRBC # BLD: 0 /100 WBCS — SIGNIFICANT CHANGE UP (ref 0–0)
PHOSPHATE SERPL-MCNC: 3.1 MG/DL — SIGNIFICANT CHANGE UP (ref 2.1–4.9)
PLATELET # BLD AUTO: 247 K/UL — SIGNIFICANT CHANGE UP (ref 130–400)
POTASSIUM SERPL-MCNC: 3.9 MMOL/L — SIGNIFICANT CHANGE UP (ref 3.5–5)
POTASSIUM SERPL-SCNC: 3.9 MMOL/L — SIGNIFICANT CHANGE UP (ref 3.5–5)
PROT SERPL-MCNC: 5.9 G/DL — LOW (ref 6–8)
RBC # BLD: 3.65 M/UL — LOW (ref 4.2–5.4)
RBC # FLD: 12.2 % — SIGNIFICANT CHANGE UP (ref 11.5–14.5)
SODIUM SERPL-SCNC: 145 MMOL/L — SIGNIFICANT CHANGE UP (ref 135–146)
WBC # BLD: 5.2 K/UL — SIGNIFICANT CHANGE UP (ref 4.8–10.8)
WBC # FLD AUTO: 5.2 K/UL — SIGNIFICANT CHANGE UP (ref 4.8–10.8)

## 2023-04-09 PROCEDURE — 99231 SBSQ HOSP IP/OBS SF/LOW 25: CPT

## 2023-04-09 RX ORDER — MAGNESIUM SULFATE 500 MG/ML
2 VIAL (ML) INJECTION ONCE
Refills: 0 | Status: COMPLETED | OUTPATIENT
Start: 2023-04-09 | End: 2023-04-09

## 2023-04-09 RX ORDER — HYDROXYZINE HCL 10 MG
25 TABLET ORAL ONCE
Refills: 0 | Status: COMPLETED | OUTPATIENT
Start: 2023-04-09 | End: 2023-04-09

## 2023-04-09 RX ADMIN — Medication 30 MILLIGRAM(S): at 14:07

## 2023-04-09 RX ADMIN — AMPICILLIN SODIUM AND SULBACTAM SODIUM 200 GRAM(S): 250; 125 INJECTION, POWDER, FOR SUSPENSION INTRAMUSCULAR; INTRAVENOUS at 05:03

## 2023-04-09 RX ADMIN — Medication 25 MILLIGRAM(S): at 22:19

## 2023-04-09 RX ADMIN — LIDOCAINE 1 PATCH: 4 CREAM TOPICAL at 22:12

## 2023-04-09 RX ADMIN — ZOLPIDEM TARTRATE 10 MILLIGRAM(S): 10 TABLET ORAL at 21:06

## 2023-04-09 RX ADMIN — Medication 30 MILLIGRAM(S): at 08:43

## 2023-04-09 RX ADMIN — SENNA PLUS 2 TABLET(S): 8.6 TABLET ORAL at 21:06

## 2023-04-09 RX ADMIN — Medication 30 MILLIGRAM(S): at 21:30

## 2023-04-09 RX ADMIN — Medication 5 MILLIGRAM(S): at 21:06

## 2023-04-09 RX ADMIN — Medication 30 MILLIGRAM(S): at 15:00

## 2023-04-09 RX ADMIN — LIDOCAINE 1 PATCH: 4 CREAM TOPICAL at 05:04

## 2023-04-09 RX ADMIN — Medication 500 MILLIGRAM(S): at 11:52

## 2023-04-09 RX ADMIN — AMLODIPINE BESYLATE 10 MILLIGRAM(S): 2.5 TABLET ORAL at 08:43

## 2023-04-09 RX ADMIN — Medication 25 GRAM(S): at 14:06

## 2023-04-09 RX ADMIN — AMPICILLIN SODIUM AND SULBACTAM SODIUM 200 GRAM(S): 250; 125 INJECTION, POWDER, FOR SUSPENSION INTRAMUSCULAR; INTRAVENOUS at 11:51

## 2023-04-09 RX ADMIN — Medication 1 APPLICATION(S): at 11:53

## 2023-04-09 RX ADMIN — ENOXAPARIN SODIUM 40 MILLIGRAM(S): 100 INJECTION SUBCUTANEOUS at 17:36

## 2023-04-09 RX ADMIN — AMPICILLIN SODIUM AND SULBACTAM SODIUM 200 GRAM(S): 250; 125 INJECTION, POWDER, FOR SUSPENSION INTRAMUSCULAR; INTRAVENOUS at 17:36

## 2023-04-09 RX ADMIN — Medication 1 TABLET(S): at 11:52

## 2023-04-09 RX ADMIN — Medication 1 MILLIGRAM(S): at 21:06

## 2023-04-09 RX ADMIN — LIDOCAINE 1 PATCH: 4 CREAM TOPICAL at 14:07

## 2023-04-09 RX ADMIN — ATENOLOL 100 MILLIGRAM(S): 25 TABLET ORAL at 05:03

## 2023-04-09 RX ADMIN — GABAPENTIN 300 MILLIGRAM(S): 400 CAPSULE ORAL at 17:36

## 2023-04-09 RX ADMIN — GABAPENTIN 300 MILLIGRAM(S): 400 CAPSULE ORAL at 05:04

## 2023-04-09 RX ADMIN — AMPICILLIN SODIUM AND SULBACTAM SODIUM 200 GRAM(S): 250; 125 INJECTION, POWDER, FOR SUSPENSION INTRAMUSCULAR; INTRAVENOUS at 23:04

## 2023-04-09 RX ADMIN — Medication 30 MILLIGRAM(S): at 08:48

## 2023-04-09 RX ADMIN — Medication 30 MILLIGRAM(S): at 21:05

## 2023-04-09 NOTE — DISCHARGE NOTE PROVIDER - NSFOLLOWUPCLINICS_GEN_ALL_ED_FT
[facility]  Burn  [address], [place], [address]  [place] [address]  Phone: [phone]  Fax:   Follow Up Time: 1 week
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
## 2023-04-09 NOTE — DISCHARGE NOTE PROVIDER - NSDCCPTREATMENT_GEN_ALL_CORE_FT
PRINCIPAL PROCEDURE  Procedure: Split-thickness skin graft of extremity  Findings and Treatment:
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
## 2023-04-09 NOTE — DISCHARGE NOTE PROVIDER - CARE PROVIDER_API CALL
Andrew Rosenbaum)  Plastic Surgery  [address] [place] [address]  Phone: [phone]  Fax: [phone]  Follow Up Time:

## 2023-04-09 NOTE — DISCHARGE NOTE PROVIDER - NSDCFUADDAPPT_GEN_ALL_CORE_FT
Please call [phone] to make a follow up appointment within 1 week with Dr. Rosenbaum or Dr. Chamorro. Clinic is located at [address] [place] [address] on Tuesdays (2-4pm) or Thursdays (9am-1pm).

## 2023-04-09 NOTE — DISCHARGE NOTE PROVIDER - NSDCMRMEDTOKEN_GEN_ALL_CORE_FT
ALPRAZolam 2 mg oral tablet: 1 orally 2 times a day as needed for  anxiety  Ambien 10 mg oral tablet: 1 orally once a day (at bedtime) as needed for  insomnia  gabapentin 300 mg oral tablet: 1 tab(s) orally 2 times a day  hydrocodone-acetaminophen 5 mg-325 mg oral tablet: 1 orally 4 times a day as needed for  severe pain  Nexletol 180 mg oral tablet: 1 orally once a day  Norvasc 10 mg oral tablet: 1 orally once a day  risperiDONE 0.25 mg oral tablet: 1 orally once a day  Vascepa 1 g oral capsule: 2 orally once a day   ALPRAZolam 2 mg oral tablet: 1 orally 2 times a day as needed for  anxiety  Ambien 10 mg oral tablet: 1 orally once a day (at bedtime) as needed for  insomnia  amoxicillin-clavulanate 875 mg-125 mg oral tablet: 1 tab(s) orally every 12 hours x 9 days last dose on 4/25  bacitracin 500 units/g topical ointment: Apply topically to affected area 2 times a day x 30 days as needed for  wound care 1 Apply topically to affected area 2 times a day As needed wound care  gabapentin 300 mg oral tablet: 1 tab(s) orally 2 times a day  hydrocodone-acetaminophen 5 mg-325 mg oral tablet: 1 orally 4 times a day as needed for  severe pain  Nexletol 180 mg oral tablet: 1 orally once a day  Norvasc 10 mg oral tablet: 1 orally once a day  risperiDONE 0.25 mg oral tablet: 1 orally once a day  Vascepa 1 g oral capsule: 2 orally once a day
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
## 2023-04-09 NOTE — DISCHARGE NOTE PROVIDER - NPI NUMBER (FOR SYS[employer] USE ONLY) :
[id number]]

## 2023-04-10 LAB
ALBUMIN SERPL ELPH-MCNC: 4.5 G/DL — SIGNIFICANT CHANGE UP (ref 3.5–5.2)
ALP SERPL-CCNC: 106 U/L — SIGNIFICANT CHANGE UP (ref 30–115)
ALT FLD-CCNC: 65 U/L — HIGH (ref 0–41)
ANION GAP SERPL CALC-SCNC: 13 MMOL/L — SIGNIFICANT CHANGE UP (ref 7–14)
AST SERPL-CCNC: 61 U/L — HIGH (ref 0–41)
BASOPHILS # BLD AUTO: 0.1 K/UL — SIGNIFICANT CHANGE UP (ref 0–0.2)
BASOPHILS NFR BLD AUTO: 1.5 % — HIGH (ref 0–1)
BILIRUB SERPL-MCNC: 0.5 MG/DL — SIGNIFICANT CHANGE UP (ref 0.2–1.2)
BUN SERPL-MCNC: 22 MG/DL — HIGH (ref 10–20)
CALCIUM SERPL-MCNC: 9.6 MG/DL — SIGNIFICANT CHANGE UP (ref 8.4–10.5)
CHLORIDE SERPL-SCNC: 105 MMOL/L — SIGNIFICANT CHANGE UP (ref 98–110)
CO2 SERPL-SCNC: 24 MMOL/L — SIGNIFICANT CHANGE UP (ref 17–32)
CREAT SERPL-MCNC: 0.5 MG/DL — LOW (ref 0.7–1.5)
EGFR: 109 ML/MIN/1.73M2 — SIGNIFICANT CHANGE UP
EOSINOPHIL # BLD AUTO: 0.46 K/UL — SIGNIFICANT CHANGE UP (ref 0–0.7)
EOSINOPHIL NFR BLD AUTO: 7 % — SIGNIFICANT CHANGE UP (ref 0–8)
GLUCOSE SERPL-MCNC: 99 MG/DL — SIGNIFICANT CHANGE UP (ref 70–99)
HCT VFR BLD CALC: 37.3 % — SIGNIFICANT CHANGE UP (ref 37–47)
HGB BLD-MCNC: 13 G/DL — SIGNIFICANT CHANGE UP (ref 12–16)
IMM GRANULOCYTES NFR BLD AUTO: 0.5 % — HIGH (ref 0.1–0.3)
LYMPHOCYTES # BLD AUTO: 1.68 K/UL — SIGNIFICANT CHANGE UP (ref 1.2–3.4)
LYMPHOCYTES # BLD AUTO: 25.5 % — SIGNIFICANT CHANGE UP (ref 20.5–51.1)
MAGNESIUM SERPL-MCNC: 1.8 MG/DL — SIGNIFICANT CHANGE UP (ref 1.8–2.4)
MCHC RBC-ENTMCNC: 31.5 PG — HIGH (ref 27–31)
MCHC RBC-ENTMCNC: 34.9 G/DL — SIGNIFICANT CHANGE UP (ref 32–37)
MCV RBC AUTO: 90.3 FL — SIGNIFICANT CHANGE UP (ref 81–99)
MONOCYTES # BLD AUTO: 0.7 K/UL — HIGH (ref 0.1–0.6)
MONOCYTES NFR BLD AUTO: 10.6 % — HIGH (ref 1.7–9.3)
NEUTROPHILS # BLD AUTO: 3.61 K/UL — SIGNIFICANT CHANGE UP (ref 1.4–6.5)
NEUTROPHILS NFR BLD AUTO: 54.9 % — SIGNIFICANT CHANGE UP (ref 42.2–75.2)
NRBC # BLD: 0 /100 WBCS — SIGNIFICANT CHANGE UP (ref 0–0)
PHOSPHATE SERPL-MCNC: 3.6 MG/DL — SIGNIFICANT CHANGE UP (ref 2.1–4.9)
PLATELET # BLD AUTO: 325 K/UL — SIGNIFICANT CHANGE UP (ref 130–400)
POTASSIUM SERPL-MCNC: 4.2 MMOL/L — SIGNIFICANT CHANGE UP (ref 3.5–5)
POTASSIUM SERPL-SCNC: 4.2 MMOL/L — SIGNIFICANT CHANGE UP (ref 3.5–5)
PROT SERPL-MCNC: 7 G/DL — SIGNIFICANT CHANGE UP (ref 6–8)
RBC # BLD: 4.13 M/UL — LOW (ref 4.2–5.4)
RBC # FLD: 12 % — SIGNIFICANT CHANGE UP (ref 11.5–14.5)
SODIUM SERPL-SCNC: 142 MMOL/L — SIGNIFICANT CHANGE UP (ref 135–146)
WBC # BLD: 6.58 K/UL — SIGNIFICANT CHANGE UP (ref 4.8–10.8)
WBC # FLD AUTO: 6.58 K/UL — SIGNIFICANT CHANGE UP (ref 4.8–10.8)

## 2023-04-10 PROCEDURE — 99232 SBSQ HOSP IP/OBS MODERATE 35: CPT

## 2023-04-10 RX ORDER — ACETAMINOPHEN 500 MG
650 TABLET ORAL EVERY 6 HOURS
Refills: 0 | Status: DISCONTINUED | OUTPATIENT
Start: 2023-04-10 | End: 2023-04-12

## 2023-04-10 RX ORDER — HYDROXYZINE HCL 10 MG
25 TABLET ORAL ONCE
Refills: 0 | Status: COMPLETED | OUTPATIENT
Start: 2023-04-10 | End: 2023-04-10

## 2023-04-10 RX ADMIN — Medication 30 MILLIGRAM(S): at 17:33

## 2023-04-10 RX ADMIN — GABAPENTIN 300 MILLIGRAM(S): 400 CAPSULE ORAL at 17:18

## 2023-04-10 RX ADMIN — ATENOLOL 100 MILLIGRAM(S): 25 TABLET ORAL at 05:30

## 2023-04-10 RX ADMIN — Medication 1 APPLICATION(S): at 20:04

## 2023-04-10 RX ADMIN — Medication 25 MILLIGRAM(S): at 23:26

## 2023-04-10 RX ADMIN — GABAPENTIN 300 MILLIGRAM(S): 400 CAPSULE ORAL at 05:30

## 2023-04-10 RX ADMIN — AMPICILLIN SODIUM AND SULBACTAM SODIUM 200 GRAM(S): 250; 125 INJECTION, POWDER, FOR SUSPENSION INTRAMUSCULAR; INTRAVENOUS at 17:17

## 2023-04-10 RX ADMIN — LIDOCAINE 1 PATCH: 4 CREAM TOPICAL at 14:24

## 2023-04-10 RX ADMIN — AMPICILLIN SODIUM AND SULBACTAM SODIUM 200 GRAM(S): 250; 125 INJECTION, POWDER, FOR SUSPENSION INTRAMUSCULAR; INTRAVENOUS at 06:27

## 2023-04-10 RX ADMIN — AMPICILLIN SODIUM AND SULBACTAM SODIUM 200 GRAM(S): 250; 125 INJECTION, POWDER, FOR SUSPENSION INTRAMUSCULAR; INTRAVENOUS at 12:37

## 2023-04-10 RX ADMIN — AMLODIPINE BESYLATE 10 MILLIGRAM(S): 2.5 TABLET ORAL at 08:17

## 2023-04-10 RX ADMIN — SENNA PLUS 2 TABLET(S): 8.6 TABLET ORAL at 21:24

## 2023-04-10 RX ADMIN — Medication 30 MILLIGRAM(S): at 12:49

## 2023-04-10 RX ADMIN — Medication 1 MILLIGRAM(S): at 21:26

## 2023-04-10 RX ADMIN — LIDOCAINE 1 PATCH: 4 CREAM TOPICAL at 03:04

## 2023-04-10 RX ADMIN — Medication 500 MILLIGRAM(S): at 12:35

## 2023-04-10 RX ADMIN — Medication 1 TABLET(S): at 12:35

## 2023-04-10 RX ADMIN — ZOLPIDEM TARTRATE 10 MILLIGRAM(S): 10 TABLET ORAL at 21:26

## 2023-04-10 RX ADMIN — Medication 650 MILLIGRAM(S): at 07:30

## 2023-04-10 RX ADMIN — Medication 5 MILLIGRAM(S): at 21:24

## 2023-04-10 RX ADMIN — AMPICILLIN SODIUM AND SULBACTAM SODIUM 200 GRAM(S): 250; 125 INJECTION, POWDER, FOR SUSPENSION INTRAMUSCULAR; INTRAVENOUS at 23:30

## 2023-04-10 RX ADMIN — Medication 325 MILLIGRAM(S): at 06:45

## 2023-04-10 RX ADMIN — MORPHINE SULFATE 2 MILLIGRAM(S): 50 CAPSULE, EXTENDED RELEASE ORAL at 21:34

## 2023-04-10 RX ADMIN — Medication 30 MILLIGRAM(S): at 12:34

## 2023-04-10 RX ADMIN — LIDOCAINE 1 PATCH: 4 CREAM TOPICAL at 21:34

## 2023-04-10 RX ADMIN — Medication 30 MILLIGRAM(S): at 17:18

## 2023-04-10 RX ADMIN — MORPHINE SULFATE 2 MILLIGRAM(S): 50 CAPSULE, EXTENDED RELEASE ORAL at 20:04

## 2023-04-10 RX ADMIN — ENOXAPARIN SODIUM 40 MILLIGRAM(S): 100 INJECTION SUBCUTANEOUS at 18:54
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
## 2023-04-11 LAB
ALBUMIN SERPL ELPH-MCNC: 4.2 G/DL — SIGNIFICANT CHANGE UP (ref 3.5–5.2)
ALP SERPL-CCNC: 96 U/L — SIGNIFICANT CHANGE UP (ref 30–115)
ALT FLD-CCNC: 79 U/L — HIGH (ref 0–41)
ANION GAP SERPL CALC-SCNC: 12 MMOL/L — SIGNIFICANT CHANGE UP (ref 7–14)
AST SERPL-CCNC: 65 U/L — HIGH (ref 0–41)
BASOPHILS # BLD AUTO: 0.07 K/UL — SIGNIFICANT CHANGE UP (ref 0–0.2)
BASOPHILS NFR BLD AUTO: 1.3 % — HIGH (ref 0–1)
BILIRUB SERPL-MCNC: 0.6 MG/DL — SIGNIFICANT CHANGE UP (ref 0.2–1.2)
BUN SERPL-MCNC: 20 MG/DL — SIGNIFICANT CHANGE UP (ref 10–20)
CALCIUM SERPL-MCNC: 9.4 MG/DL — SIGNIFICANT CHANGE UP (ref 8.4–10.5)
CHLORIDE SERPL-SCNC: 107 MMOL/L — SIGNIFICANT CHANGE UP (ref 98–110)
CO2 SERPL-SCNC: 24 MMOL/L — SIGNIFICANT CHANGE UP (ref 17–32)
CREAT SERPL-MCNC: 0.6 MG/DL — LOW (ref 0.7–1.5)
EGFR: 105 ML/MIN/1.73M2 — SIGNIFICANT CHANGE UP
EOSINOPHIL # BLD AUTO: 0.43 K/UL — SIGNIFICANT CHANGE UP (ref 0–0.7)
EOSINOPHIL NFR BLD AUTO: 7.9 % — SIGNIFICANT CHANGE UP (ref 0–8)
GLUCOSE SERPL-MCNC: 100 MG/DL — HIGH (ref 70–99)
HCG SERPL-ACNC: 1.7 MIU/ML — SIGNIFICANT CHANGE UP
HCT VFR BLD CALC: 35.6 % — LOW (ref 37–47)
HGB BLD-MCNC: 12.1 G/DL — SIGNIFICANT CHANGE UP (ref 12–16)
IMM GRANULOCYTES NFR BLD AUTO: 0.7 % — HIGH (ref 0.1–0.3)
LYMPHOCYTES # BLD AUTO: 1.44 K/UL — SIGNIFICANT CHANGE UP (ref 1.2–3.4)
LYMPHOCYTES # BLD AUTO: 26.6 % — SIGNIFICANT CHANGE UP (ref 20.5–51.1)
MAGNESIUM SERPL-MCNC: 1.7 MG/DL — LOW (ref 1.8–2.4)
MCHC RBC-ENTMCNC: 30.9 PG — SIGNIFICANT CHANGE UP (ref 27–31)
MCHC RBC-ENTMCNC: 34 G/DL — SIGNIFICANT CHANGE UP (ref 32–37)
MCV RBC AUTO: 91 FL — SIGNIFICANT CHANGE UP (ref 81–99)
MONOCYTES # BLD AUTO: 0.58 K/UL — SIGNIFICANT CHANGE UP (ref 0.1–0.6)
MONOCYTES NFR BLD AUTO: 10.7 % — HIGH (ref 1.7–9.3)
NEUTROPHILS # BLD AUTO: 2.86 K/UL — SIGNIFICANT CHANGE UP (ref 1.4–6.5)
NEUTROPHILS NFR BLD AUTO: 52.8 % — SIGNIFICANT CHANGE UP (ref 42.2–75.2)
NRBC # BLD: 0 /100 WBCS — SIGNIFICANT CHANGE UP (ref 0–0)
PHOSPHATE SERPL-MCNC: 4.4 MG/DL — SIGNIFICANT CHANGE UP (ref 2.1–4.9)
PLATELET # BLD AUTO: 283 K/UL — SIGNIFICANT CHANGE UP (ref 130–400)
PMV BLD: 9.9 FL — SIGNIFICANT CHANGE UP (ref 7.4–10.4)
POTASSIUM SERPL-MCNC: 4.2 MMOL/L — SIGNIFICANT CHANGE UP (ref 3.5–5)
POTASSIUM SERPL-SCNC: 4.2 MMOL/L — SIGNIFICANT CHANGE UP (ref 3.5–5)
PROT SERPL-MCNC: 6.3 G/DL — SIGNIFICANT CHANGE UP (ref 6–8)
RBC # BLD: 3.91 M/UL — LOW (ref 4.2–5.4)
RBC # FLD: 12.3 % — SIGNIFICANT CHANGE UP (ref 11.5–14.5)
SARS-COV-2 RNA SPEC QL NAA+PROBE: SIGNIFICANT CHANGE UP
SODIUM SERPL-SCNC: 143 MMOL/L — SIGNIFICANT CHANGE UP (ref 135–146)
WBC # BLD: 5.42 K/UL — SIGNIFICANT CHANGE UP (ref 4.8–10.8)
WBC # FLD AUTO: 5.42 K/UL — SIGNIFICANT CHANGE UP (ref 4.8–10.8)

## 2023-04-11 PROCEDURE — 99232 SBSQ HOSP IP/OBS MODERATE 35: CPT | Mod: 57

## 2023-04-11 RX ORDER — HYDROXYZINE HCL 10 MG
25 TABLET ORAL AT BEDTIME
Refills: 0 | Status: DISCONTINUED | OUTPATIENT
Start: 2023-04-11 | End: 2023-04-12

## 2023-04-11 RX ORDER — KETOROLAC TROMETHAMINE 30 MG/ML
30 SYRINGE (ML) INJECTION EVERY 6 HOURS
Refills: 0 | Status: DISCONTINUED | OUTPATIENT
Start: 2023-04-11 | End: 2023-04-12

## 2023-04-11 RX ORDER — SODIUM CHLORIDE 9 MG/ML
1000 INJECTION, SOLUTION INTRAVENOUS
Refills: 0 | Status: DISCONTINUED | OUTPATIENT
Start: 2023-04-11 | End: 2023-04-12

## 2023-04-11 RX ORDER — KETOROLAC TROMETHAMINE 30 MG/ML
30 SYRINGE (ML) INJECTION EVERY 6 HOURS
Refills: 0 | Status: DISCONTINUED | OUTPATIENT
Start: 2023-04-11 | End: 2023-04-11

## 2023-04-11 RX ORDER — CHLORHEXIDINE GLUCONATE 213 G/1000ML
1 SOLUTION TOPICAL DAILY
Refills: 0 | Status: DISCONTINUED | OUTPATIENT
Start: 2023-04-11 | End: 2023-04-12

## 2023-04-11 RX ORDER — MAGNESIUM SULFATE 500 MG/ML
2 VIAL (ML) INJECTION ONCE
Refills: 0 | Status: COMPLETED | OUTPATIENT
Start: 2023-04-11 | End: 2023-04-11

## 2023-04-11 RX ORDER — HYDROXYZINE HCL 10 MG
25 TABLET ORAL ONCE
Refills: 0 | Status: COMPLETED | OUTPATIENT
Start: 2023-04-11 | End: 2023-04-11

## 2023-04-11 RX ADMIN — Medication 30 MILLIGRAM(S): at 05:54

## 2023-04-11 RX ADMIN — ENOXAPARIN SODIUM 40 MILLIGRAM(S): 100 INJECTION SUBCUTANEOUS at 18:11

## 2023-04-11 RX ADMIN — Medication 1 APPLICATION(S): at 20:20

## 2023-04-11 RX ADMIN — AMPICILLIN SODIUM AND SULBACTAM SODIUM 200 GRAM(S): 250; 125 INJECTION, POWDER, FOR SUSPENSION INTRAMUSCULAR; INTRAVENOUS at 05:54

## 2023-04-11 RX ADMIN — SODIUM CHLORIDE 75 MILLILITER(S): 9 INJECTION, SOLUTION INTRAVENOUS at 23:56

## 2023-04-11 RX ADMIN — Medication 1 TABLET(S): at 11:39

## 2023-04-11 RX ADMIN — ATENOLOL 100 MILLIGRAM(S): 25 TABLET ORAL at 05:55

## 2023-04-11 RX ADMIN — LIDOCAINE 1 PATCH: 4 CREAM TOPICAL at 14:26

## 2023-04-11 RX ADMIN — LIDOCAINE 1 PATCH: 4 CREAM TOPICAL at 03:00

## 2023-04-11 RX ADMIN — Medication 25 GRAM(S): at 15:43

## 2023-04-11 RX ADMIN — Medication 500 MILLIGRAM(S): at 11:39

## 2023-04-11 RX ADMIN — GABAPENTIN 300 MILLIGRAM(S): 400 CAPSULE ORAL at 05:54

## 2023-04-11 RX ADMIN — LIDOCAINE 1 PATCH: 4 CREAM TOPICAL at 21:03

## 2023-04-11 RX ADMIN — Medication 30 MILLIGRAM(S): at 21:03

## 2023-04-11 RX ADMIN — Medication 30 MILLIGRAM(S): at 20:20

## 2023-04-11 RX ADMIN — CHLORHEXIDINE GLUCONATE 1 APPLICATION(S): 213 SOLUTION TOPICAL at 12:42

## 2023-04-11 RX ADMIN — AMPICILLIN SODIUM AND SULBACTAM SODIUM 200 GRAM(S): 250; 125 INJECTION, POWDER, FOR SUSPENSION INTRAMUSCULAR; INTRAVENOUS at 23:26

## 2023-04-11 RX ADMIN — Medication 30 MILLIGRAM(S): at 13:24

## 2023-04-11 RX ADMIN — SENNA PLUS 2 TABLET(S): 8.6 TABLET ORAL at 21:39

## 2023-04-11 RX ADMIN — GABAPENTIN 300 MILLIGRAM(S): 400 CAPSULE ORAL at 18:11

## 2023-04-11 RX ADMIN — AMLODIPINE BESYLATE 10 MILLIGRAM(S): 2.5 TABLET ORAL at 08:42

## 2023-04-11 RX ADMIN — Medication 5 MILLIGRAM(S): at 21:39

## 2023-04-11 RX ADMIN — ZOLPIDEM TARTRATE 10 MILLIGRAM(S): 10 TABLET ORAL at 20:20

## 2023-04-11 RX ADMIN — AMPICILLIN SODIUM AND SULBACTAM SODIUM 200 GRAM(S): 250; 125 INJECTION, POWDER, FOR SUSPENSION INTRAMUSCULAR; INTRAVENOUS at 11:38

## 2023-04-11 RX ADMIN — AMPICILLIN SODIUM AND SULBACTAM SODIUM 200 GRAM(S): 250; 125 INJECTION, POWDER, FOR SUSPENSION INTRAMUSCULAR; INTRAVENOUS at 18:11

## 2023-04-11 RX ADMIN — Medication 30 MILLIGRAM(S): at 12:42

## 2023-04-11 RX ADMIN — Medication 1 MILLIGRAM(S): at 20:20

## 2023-04-11 RX ADMIN — Medication 25 MILLIGRAM(S): at 23:37
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
## 2023-04-12 LAB
ALBUMIN SERPL ELPH-MCNC: 4.6 G/DL — SIGNIFICANT CHANGE UP (ref 3.5–5.2)
ALP SERPL-CCNC: 96 U/L — SIGNIFICANT CHANGE UP (ref 30–115)
ALT FLD-CCNC: 78 U/L — HIGH (ref 0–41)
ANION GAP SERPL CALC-SCNC: 12 MMOL/L — SIGNIFICANT CHANGE UP (ref 7–14)
AST SERPL-CCNC: 54 U/L — HIGH (ref 0–41)
BASOPHILS # BLD AUTO: 0.08 K/UL — SIGNIFICANT CHANGE UP (ref 0–0.2)
BASOPHILS NFR BLD AUTO: 0.9 % — SIGNIFICANT CHANGE UP (ref 0–1)
BILIRUB SERPL-MCNC: 0.6 MG/DL — SIGNIFICANT CHANGE UP (ref 0.2–1.2)
BUN SERPL-MCNC: 22 MG/DL — HIGH (ref 10–20)
CALCIUM SERPL-MCNC: 9.5 MG/DL — SIGNIFICANT CHANGE UP (ref 8.4–10.5)
CHLORIDE SERPL-SCNC: 104 MMOL/L — SIGNIFICANT CHANGE UP (ref 98–110)
CO2 SERPL-SCNC: 25 MMOL/L — SIGNIFICANT CHANGE UP (ref 17–32)
CREAT SERPL-MCNC: 0.6 MG/DL — LOW (ref 0.7–1.5)
EGFR: 105 ML/MIN/1.73M2 — SIGNIFICANT CHANGE UP
EOSINOPHIL # BLD AUTO: 0.29 K/UL — SIGNIFICANT CHANGE UP (ref 0–0.7)
EOSINOPHIL NFR BLD AUTO: 3.1 % — SIGNIFICANT CHANGE UP (ref 0–8)
GLUCOSE SERPL-MCNC: 133 MG/DL — HIGH (ref 70–99)
HCT VFR BLD CALC: 38.1 % — SIGNIFICANT CHANGE UP (ref 37–47)
HGB BLD-MCNC: 12.6 G/DL — SIGNIFICANT CHANGE UP (ref 12–16)
IMM GRANULOCYTES NFR BLD AUTO: 0.8 % — HIGH (ref 0.1–0.3)
LYMPHOCYTES # BLD AUTO: 1.25 K/UL — SIGNIFICANT CHANGE UP (ref 1.2–3.4)
LYMPHOCYTES # BLD AUTO: 13.4 % — LOW (ref 20.5–51.1)
MAGNESIUM SERPL-MCNC: 1.8 MG/DL — SIGNIFICANT CHANGE UP (ref 1.8–2.4)
MCHC RBC-ENTMCNC: 31 PG — SIGNIFICANT CHANGE UP (ref 27–31)
MCHC RBC-ENTMCNC: 33.1 G/DL — SIGNIFICANT CHANGE UP (ref 32–37)
MCV RBC AUTO: 93.8 FL — SIGNIFICANT CHANGE UP (ref 81–99)
MONOCYTES # BLD AUTO: 0.31 K/UL — SIGNIFICANT CHANGE UP (ref 0.1–0.6)
MONOCYTES NFR BLD AUTO: 3.3 % — SIGNIFICANT CHANGE UP (ref 1.7–9.3)
NEUTROPHILS # BLD AUTO: 7.33 K/UL — HIGH (ref 1.4–6.5)
NEUTROPHILS NFR BLD AUTO: 78.5 % — HIGH (ref 42.2–75.2)
NRBC # BLD: 0 /100 WBCS — SIGNIFICANT CHANGE UP (ref 0–0)
PHOSPHATE SERPL-MCNC: 4.3 MG/DL — SIGNIFICANT CHANGE UP (ref 2.1–4.9)
PLATELET # BLD AUTO: 333 K/UL — SIGNIFICANT CHANGE UP (ref 130–400)
PMV BLD: 9.8 FL — SIGNIFICANT CHANGE UP (ref 7.4–10.4)
POTASSIUM SERPL-MCNC: 4.4 MMOL/L — SIGNIFICANT CHANGE UP (ref 3.5–5)
POTASSIUM SERPL-SCNC: 4.4 MMOL/L — SIGNIFICANT CHANGE UP (ref 3.5–5)
PROT SERPL-MCNC: 6.9 G/DL — SIGNIFICANT CHANGE UP (ref 6–8)
RBC # BLD: 4.06 M/UL — LOW (ref 4.2–5.4)
RBC # FLD: 12.3 % — SIGNIFICANT CHANGE UP (ref 11.5–14.5)
SODIUM SERPL-SCNC: 141 MMOL/L — SIGNIFICANT CHANGE UP (ref 135–146)
SURGICAL PATHOLOGY STUDY: SIGNIFICANT CHANGE UP
WBC # BLD: 9.33 K/UL — SIGNIFICANT CHANGE UP (ref 4.8–10.8)
WBC # FLD AUTO: 9.33 K/UL — SIGNIFICANT CHANGE UP (ref 4.8–10.8)

## 2023-04-12 PROCEDURE — 36000 PLACE NEEDLE IN VEIN: CPT

## 2023-04-12 PROCEDURE — 76937 US GUIDE VASCULAR ACCESS: CPT | Mod: 26

## 2023-04-12 RX ORDER — ALPRAZOLAM 0.25 MG
1 TABLET ORAL
Refills: 0 | Status: DISCONTINUED | OUTPATIENT
Start: 2023-04-12 | End: 2023-04-17

## 2023-04-12 RX ORDER — CHLORHEXIDINE GLUCONATE 213 G/1000ML
1 SOLUTION TOPICAL DAILY
Refills: 0 | Status: DISCONTINUED | OUTPATIENT
Start: 2023-04-12 | End: 2023-04-17

## 2023-04-12 RX ORDER — SODIUM CHLORIDE 9 MG/ML
1000 INJECTION, SOLUTION INTRAVENOUS
Refills: 0 | Status: DISCONTINUED | OUTPATIENT
Start: 2023-04-12 | End: 2023-04-12

## 2023-04-12 RX ORDER — LANOLIN ALCOHOL/MO/W.PET/CERES
5 CREAM (GRAM) TOPICAL AT BEDTIME
Refills: 0 | Status: DISCONTINUED | OUTPATIENT
Start: 2023-04-12 | End: 2023-04-17

## 2023-04-12 RX ORDER — ONDANSETRON 8 MG/1
4 TABLET, FILM COATED ORAL ONCE
Refills: 0 | Status: DISCONTINUED | OUTPATIENT
Start: 2023-04-12 | End: 2023-04-12

## 2023-04-12 RX ORDER — AMLODIPINE BESYLATE 2.5 MG/1
10 TABLET ORAL DAILY
Refills: 0 | Status: DISCONTINUED | OUTPATIENT
Start: 2023-04-12 | End: 2023-04-17

## 2023-04-12 RX ORDER — KETOROLAC TROMETHAMINE 30 MG/ML
30 SYRINGE (ML) INJECTION EVERY 6 HOURS
Refills: 0 | Status: DISCONTINUED | OUTPATIENT
Start: 2023-04-12 | End: 2023-04-12

## 2023-04-12 RX ORDER — ENOXAPARIN SODIUM 100 MG/ML
40 INJECTION SUBCUTANEOUS EVERY 24 HOURS
Refills: 0 | Status: DISCONTINUED | OUTPATIENT
Start: 2023-04-12 | End: 2023-04-17

## 2023-04-12 RX ORDER — ZOLPIDEM TARTRATE 10 MG/1
10 TABLET ORAL AT BEDTIME
Refills: 0 | Status: DISCONTINUED | OUTPATIENT
Start: 2023-04-12 | End: 2023-04-17

## 2023-04-12 RX ORDER — ASCORBIC ACID 60 MG
500 TABLET,CHEWABLE ORAL DAILY
Refills: 0 | Status: DISCONTINUED | OUTPATIENT
Start: 2023-04-12 | End: 2023-04-17

## 2023-04-12 RX ORDER — MULTIVIT-MIN/FERROUS GLUCONATE 9 MG/15 ML
1 LIQUID (ML) ORAL DAILY
Refills: 0 | Status: DISCONTINUED | OUTPATIENT
Start: 2023-04-12 | End: 2023-04-17

## 2023-04-12 RX ORDER — AMPICILLIN SODIUM AND SULBACTAM SODIUM 250; 125 MG/ML; MG/ML
3 INJECTION, POWDER, FOR SUSPENSION INTRAMUSCULAR; INTRAVENOUS EVERY 6 HOURS
Refills: 0 | Status: DISCONTINUED | OUTPATIENT
Start: 2023-04-12 | End: 2023-04-12

## 2023-04-12 RX ORDER — RISPERIDONE 4 MG/1
0.25 TABLET ORAL AT BEDTIME
Refills: 0 | Status: DISCONTINUED | OUTPATIENT
Start: 2023-04-12 | End: 2023-04-17

## 2023-04-12 RX ORDER — ACETAMINOPHEN 500 MG
650 TABLET ORAL EVERY 6 HOURS
Refills: 0 | Status: DISCONTINUED | OUTPATIENT
Start: 2023-04-12 | End: 2023-04-17

## 2023-04-12 RX ORDER — IBUPROFEN 200 MG
600 TABLET ORAL EVERY 6 HOURS
Refills: 0 | Status: DISCONTINUED | OUTPATIENT
Start: 2023-04-12 | End: 2023-04-12

## 2023-04-12 RX ORDER — OXYCODONE AND ACETAMINOPHEN 5; 325 MG/1; MG/1
1 TABLET ORAL EVERY 6 HOURS
Refills: 0 | Status: DISCONTINUED | OUTPATIENT
Start: 2023-04-12 | End: 2023-04-12

## 2023-04-12 RX ORDER — HYDROXYZINE HCL 10 MG
25 TABLET ORAL AT BEDTIME
Refills: 0 | Status: DISCONTINUED | OUTPATIENT
Start: 2023-04-12 | End: 2023-04-17

## 2023-04-12 RX ORDER — GABAPENTIN 400 MG/1
300 CAPSULE ORAL
Refills: 0 | Status: DISCONTINUED | OUTPATIENT
Start: 2023-04-12 | End: 2023-04-17

## 2023-04-12 RX ORDER — LIDOCAINE 4 G/100G
1 CREAM TOPICAL EVERY 24 HOURS
Refills: 0 | Status: DISCONTINUED | OUTPATIENT
Start: 2023-04-12 | End: 2023-04-17

## 2023-04-12 RX ORDER — SENNA PLUS 8.6 MG/1
2 TABLET ORAL DAILY
Refills: 0 | Status: DISCONTINUED | OUTPATIENT
Start: 2023-04-12 | End: 2023-04-17

## 2023-04-12 RX ORDER — AMPICILLIN SODIUM AND SULBACTAM SODIUM 250; 125 MG/ML; MG/ML
3 INJECTION, POWDER, FOR SUSPENSION INTRAMUSCULAR; INTRAVENOUS EVERY 6 HOURS
Refills: 0 | Status: DISCONTINUED | OUTPATIENT
Start: 2023-04-12 | End: 2023-04-16

## 2023-04-12 RX ORDER — ATENOLOL 25 MG/1
100 TABLET ORAL DAILY
Refills: 0 | Status: DISCONTINUED | OUTPATIENT
Start: 2023-04-12 | End: 2023-04-17

## 2023-04-12 RX ORDER — KETOROLAC TROMETHAMINE 30 MG/ML
30 SYRINGE (ML) INJECTION EVERY 6 HOURS
Refills: 0 | Status: DISCONTINUED | OUTPATIENT
Start: 2023-04-12 | End: 2023-04-17

## 2023-04-12 RX ORDER — ALPRAZOLAM 0.25 MG
1 TABLET ORAL ONCE
Refills: 0 | Status: DISCONTINUED | OUTPATIENT
Start: 2023-04-12 | End: 2023-04-12

## 2023-04-12 RX ADMIN — SENNA PLUS 2 TABLET(S): 8.6 TABLET ORAL at 21:21

## 2023-04-12 RX ADMIN — LIDOCAINE 1 PATCH: 4 CREAM TOPICAL at 15:34

## 2023-04-12 RX ADMIN — LIDOCAINE 1 PATCH: 4 CREAM TOPICAL at 21:22

## 2023-04-12 RX ADMIN — AMPICILLIN SODIUM AND SULBACTAM SODIUM 200 GRAM(S): 250; 125 INJECTION, POWDER, FOR SUSPENSION INTRAMUSCULAR; INTRAVENOUS at 23:55

## 2023-04-12 RX ADMIN — Medication 500 MILLIGRAM(S): at 13:13

## 2023-04-12 RX ADMIN — RISPERIDONE 0.25 MILLIGRAM(S): 4 TABLET ORAL at 21:21

## 2023-04-12 RX ADMIN — Medication 30 MILLIGRAM(S): at 15:34

## 2023-04-12 RX ADMIN — GABAPENTIN 300 MILLIGRAM(S): 400 CAPSULE ORAL at 05:13

## 2023-04-12 RX ADMIN — Medication 1 TABLET(S): at 13:13

## 2023-04-12 RX ADMIN — Medication 5 MILLIGRAM(S): at 21:20

## 2023-04-12 RX ADMIN — LIDOCAINE 1 PATCH: 4 CREAM TOPICAL at 03:00

## 2023-04-12 RX ADMIN — Medication 1 MILLIGRAM(S): at 21:20

## 2023-04-12 RX ADMIN — ENOXAPARIN SODIUM 40 MILLIGRAM(S): 100 INJECTION SUBCUTANEOUS at 15:36

## 2023-04-12 RX ADMIN — ATENOLOL 100 MILLIGRAM(S): 25 TABLET ORAL at 05:14

## 2023-04-12 RX ADMIN — ZOLPIDEM TARTRATE 10 MILLIGRAM(S): 10 TABLET ORAL at 21:21

## 2023-04-12 RX ADMIN — Medication 30 MILLIGRAM(S): at 16:07

## 2023-04-12 RX ADMIN — GABAPENTIN 300 MILLIGRAM(S): 400 CAPSULE ORAL at 17:39

## 2023-04-12 RX ADMIN — Medication 1 MILLIGRAM(S): at 11:56

## 2023-04-12 RX ADMIN — AMPICILLIN SODIUM AND SULBACTAM SODIUM 200 GRAM(S): 250; 125 INJECTION, POWDER, FOR SUSPENSION INTRAMUSCULAR; INTRAVENOUS at 17:38

## 2023-04-12 RX ADMIN — AMPICILLIN SODIUM AND SULBACTAM SODIUM 200 GRAM(S): 250; 125 INJECTION, POWDER, FOR SUSPENSION INTRAMUSCULAR; INTRAVENOUS at 05:08

## 2023-04-12 NOTE — PRE-OP CHECKLIST - SELECT TESTS ORDERED
Serum pregnancy negative 4/6/BMP/CBC/CMP/PT/PTT/INR/Type and Screen/HCG/EKG/CXR/Results in MD note/COVID-19
BMP/CBC/CMP/Results in MD note/COVID-19
Results in MD note/COVID-19

## 2023-04-12 NOTE — PRE-OP CHECKLIST - BMI (KG/M2)
Action Requested: Summary for Provider     []  Critical Lab, Recommendations Needed  [] Need Additional Advice  [x]   FYI    []   Need Orders  [] Need Medications Sent to Pharmacy  []  Other     SUMMARY: Per protocol advised :OV today, patient declines vinny
28.4
28.4
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
## 2023-04-13 LAB
ALBUMIN SERPL ELPH-MCNC: 4.4 G/DL — SIGNIFICANT CHANGE UP (ref 3.5–5.2)
ALP SERPL-CCNC: 85 U/L — SIGNIFICANT CHANGE UP (ref 30–115)
ALT FLD-CCNC: 82 U/L — HIGH (ref 0–41)
ANION GAP SERPL CALC-SCNC: 12 MMOL/L — SIGNIFICANT CHANGE UP (ref 7–14)
AST SERPL-CCNC: 52 U/L — HIGH (ref 0–41)
BASOPHILS # BLD AUTO: 0.05 K/UL — SIGNIFICANT CHANGE UP (ref 0–0.2)
BASOPHILS NFR BLD AUTO: 0.4 % — SIGNIFICANT CHANGE UP (ref 0–1)
BILIRUB SERPL-MCNC: 0.5 MG/DL — SIGNIFICANT CHANGE UP (ref 0.2–1.2)
BUN SERPL-MCNC: 26 MG/DL — HIGH (ref 10–20)
CALCIUM SERPL-MCNC: 9.6 MG/DL — SIGNIFICANT CHANGE UP (ref 8.4–10.5)
CHLORIDE SERPL-SCNC: 102 MMOL/L — SIGNIFICANT CHANGE UP (ref 98–110)
CO2 SERPL-SCNC: 25 MMOL/L — SIGNIFICANT CHANGE UP (ref 17–32)
CREAT SERPL-MCNC: 0.6 MG/DL — LOW (ref 0.7–1.5)
EGFR: 105 ML/MIN/1.73M2 — SIGNIFICANT CHANGE UP
EOSINOPHIL # BLD AUTO: 0.06 K/UL — SIGNIFICANT CHANGE UP (ref 0–0.7)
EOSINOPHIL NFR BLD AUTO: 0.4 % — SIGNIFICANT CHANGE UP (ref 0–8)
GLUCOSE SERPL-MCNC: 86 MG/DL — SIGNIFICANT CHANGE UP (ref 70–99)
HCT VFR BLD CALC: 34.3 % — LOW (ref 37–47)
HGB BLD-MCNC: 11.6 G/DL — LOW (ref 12–16)
IMM GRANULOCYTES NFR BLD AUTO: 0.7 % — HIGH (ref 0.1–0.3)
LYMPHOCYTES # BLD AUTO: 1.69 K/UL — SIGNIFICANT CHANGE UP (ref 1.2–3.4)
LYMPHOCYTES # BLD AUTO: 12 % — LOW (ref 20.5–51.1)
MAGNESIUM SERPL-MCNC: 1.8 MG/DL — SIGNIFICANT CHANGE UP (ref 1.8–2.4)
MCHC RBC-ENTMCNC: 30.8 PG — SIGNIFICANT CHANGE UP (ref 27–31)
MCHC RBC-ENTMCNC: 33.8 G/DL — SIGNIFICANT CHANGE UP (ref 32–37)
MCV RBC AUTO: 91 FL — SIGNIFICANT CHANGE UP (ref 81–99)
MONOCYTES # BLD AUTO: 1.04 K/UL — HIGH (ref 0.1–0.6)
MONOCYTES NFR BLD AUTO: 7.4 % — SIGNIFICANT CHANGE UP (ref 1.7–9.3)
NEUTROPHILS # BLD AUTO: 11.13 K/UL — HIGH (ref 1.4–6.5)
NEUTROPHILS NFR BLD AUTO: 79.1 % — HIGH (ref 42.2–75.2)
NRBC # BLD: 0 /100 WBCS — SIGNIFICANT CHANGE UP (ref 0–0)
PHOSPHATE SERPL-MCNC: 3.5 MG/DL — SIGNIFICANT CHANGE UP (ref 2.1–4.9)
PLATELET # BLD AUTO: 374 K/UL — SIGNIFICANT CHANGE UP (ref 130–400)
PMV BLD: 9.9 FL — SIGNIFICANT CHANGE UP (ref 7.4–10.4)
POTASSIUM SERPL-MCNC: 3.9 MMOL/L — SIGNIFICANT CHANGE UP (ref 3.5–5)
POTASSIUM SERPL-SCNC: 3.9 MMOL/L — SIGNIFICANT CHANGE UP (ref 3.5–5)
PROT SERPL-MCNC: 6.7 G/DL — SIGNIFICANT CHANGE UP (ref 6–8)
RBC # BLD: 3.77 M/UL — LOW (ref 4.2–5.4)
RBC # FLD: 12.2 % — SIGNIFICANT CHANGE UP (ref 11.5–14.5)
SODIUM SERPL-SCNC: 139 MMOL/L — SIGNIFICANT CHANGE UP (ref 135–146)
WBC # BLD: 14.07 K/UL — HIGH (ref 4.8–10.8)
WBC # FLD AUTO: 14.07 K/UL — HIGH (ref 4.8–10.8)

## 2023-04-13 PROCEDURE — 99232 SBSQ HOSP IP/OBS MODERATE 35: CPT | Mod: 24

## 2023-04-13 RX ORDER — PANTOPRAZOLE SODIUM 20 MG/1
40 TABLET, DELAYED RELEASE ORAL
Refills: 0 | Status: DISCONTINUED | OUTPATIENT
Start: 2023-04-13 | End: 2023-04-17

## 2023-04-13 RX ADMIN — RISPERIDONE 0.25 MILLIGRAM(S): 4 TABLET ORAL at 21:18

## 2023-04-13 RX ADMIN — GABAPENTIN 300 MILLIGRAM(S): 400 CAPSULE ORAL at 17:05

## 2023-04-13 RX ADMIN — SENNA PLUS 2 TABLET(S): 8.6 TABLET ORAL at 21:18

## 2023-04-13 RX ADMIN — AMPICILLIN SODIUM AND SULBACTAM SODIUM 200 GRAM(S): 250; 125 INJECTION, POWDER, FOR SUSPENSION INTRAMUSCULAR; INTRAVENOUS at 05:25

## 2023-04-13 RX ADMIN — Medication 5 MILLIGRAM(S): at 21:18

## 2023-04-13 RX ADMIN — ATENOLOL 100 MILLIGRAM(S): 25 TABLET ORAL at 05:35

## 2023-04-13 RX ADMIN — Medication 30 MILLIGRAM(S): at 06:25

## 2023-04-13 RX ADMIN — Medication 1 MILLIGRAM(S): at 21:23

## 2023-04-13 RX ADMIN — AMLODIPINE BESYLATE 10 MILLIGRAM(S): 2.5 TABLET ORAL at 05:25

## 2023-04-13 RX ADMIN — LIDOCAINE 1 PATCH: 4 CREAM TOPICAL at 17:05

## 2023-04-13 RX ADMIN — Medication 30 MILLIGRAM(S): at 22:49

## 2023-04-13 RX ADMIN — Medication 30 MILLIGRAM(S): at 21:24

## 2023-04-13 RX ADMIN — Medication 1 TABLET(S): at 12:09

## 2023-04-13 RX ADMIN — LIDOCAINE 1 PATCH: 4 CREAM TOPICAL at 13:03

## 2023-04-13 RX ADMIN — AMPICILLIN SODIUM AND SULBACTAM SODIUM 200 GRAM(S): 250; 125 INJECTION, POWDER, FOR SUSPENSION INTRAMUSCULAR; INTRAVENOUS at 17:05

## 2023-04-13 RX ADMIN — Medication 25 MILLIGRAM(S): at 21:25

## 2023-04-13 RX ADMIN — AMPICILLIN SODIUM AND SULBACTAM SODIUM 200 GRAM(S): 250; 125 INJECTION, POWDER, FOR SUSPENSION INTRAMUSCULAR; INTRAVENOUS at 12:07

## 2023-04-13 RX ADMIN — Medication 30 MILLIGRAM(S): at 12:30

## 2023-04-13 RX ADMIN — ENOXAPARIN SODIUM 40 MILLIGRAM(S): 100 INJECTION SUBCUTANEOUS at 12:11

## 2023-04-13 RX ADMIN — CHLORHEXIDINE GLUCONATE 1 APPLICATION(S): 213 SOLUTION TOPICAL at 12:08

## 2023-04-13 RX ADMIN — Medication 500 MILLIGRAM(S): at 12:08

## 2023-04-13 RX ADMIN — Medication 30 MILLIGRAM(S): at 12:45

## 2023-04-13 RX ADMIN — PANTOPRAZOLE SODIUM 40 MILLIGRAM(S): 20 TABLET, DELAYED RELEASE ORAL at 05:40

## 2023-04-13 RX ADMIN — LIDOCAINE 1 PATCH: 4 CREAM TOPICAL at 02:36

## 2023-04-13 RX ADMIN — ZOLPIDEM TARTRATE 10 MILLIGRAM(S): 10 TABLET ORAL at 21:25

## 2023-04-13 RX ADMIN — GABAPENTIN 300 MILLIGRAM(S): 400 CAPSULE ORAL at 05:35
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
## 2023-04-14 RX ADMIN — GABAPENTIN 300 MILLIGRAM(S): 400 CAPSULE ORAL at 17:14

## 2023-04-14 RX ADMIN — Medication 1 TABLET(S): at 11:36

## 2023-04-14 RX ADMIN — LIDOCAINE 1 PATCH: 4 CREAM TOPICAL at 01:54

## 2023-04-14 RX ADMIN — ZOLPIDEM TARTRATE 10 MILLIGRAM(S): 10 TABLET ORAL at 21:03

## 2023-04-14 RX ADMIN — ENOXAPARIN SODIUM 40 MILLIGRAM(S): 100 INJECTION SUBCUTANEOUS at 11:35

## 2023-04-14 RX ADMIN — AMPICILLIN SODIUM AND SULBACTAM SODIUM 200 GRAM(S): 250; 125 INJECTION, POWDER, FOR SUSPENSION INTRAMUSCULAR; INTRAVENOUS at 05:48

## 2023-04-14 RX ADMIN — PANTOPRAZOLE SODIUM 40 MILLIGRAM(S): 20 TABLET, DELAYED RELEASE ORAL at 06:10

## 2023-04-14 RX ADMIN — CHLORHEXIDINE GLUCONATE 1 APPLICATION(S): 213 SOLUTION TOPICAL at 11:36

## 2023-04-14 RX ADMIN — AMPICILLIN SODIUM AND SULBACTAM SODIUM 200 GRAM(S): 250; 125 INJECTION, POWDER, FOR SUSPENSION INTRAMUSCULAR; INTRAVENOUS at 11:34

## 2023-04-14 RX ADMIN — Medication 1 MILLIGRAM(S): at 21:03

## 2023-04-14 RX ADMIN — AMPICILLIN SODIUM AND SULBACTAM SODIUM 200 GRAM(S): 250; 125 INJECTION, POWDER, FOR SUSPENSION INTRAMUSCULAR; INTRAVENOUS at 17:15

## 2023-04-14 RX ADMIN — SENNA PLUS 2 TABLET(S): 8.6 TABLET ORAL at 21:03

## 2023-04-14 RX ADMIN — LIDOCAINE 1 PATCH: 4 CREAM TOPICAL at 13:00

## 2023-04-14 RX ADMIN — ATENOLOL 100 MILLIGRAM(S): 25 TABLET ORAL at 05:47

## 2023-04-14 RX ADMIN — AMPICILLIN SODIUM AND SULBACTAM SODIUM 200 GRAM(S): 250; 125 INJECTION, POWDER, FOR SUSPENSION INTRAMUSCULAR; INTRAVENOUS at 23:38

## 2023-04-14 RX ADMIN — LIDOCAINE 1 PATCH: 4 CREAM TOPICAL at 18:07

## 2023-04-14 RX ADMIN — GABAPENTIN 300 MILLIGRAM(S): 400 CAPSULE ORAL at 05:46

## 2023-04-14 RX ADMIN — Medication 30 MILLIGRAM(S): at 05:48

## 2023-04-14 RX ADMIN — Medication 30 MILLIGRAM(S): at 12:48

## 2023-04-14 RX ADMIN — Medication 30 MILLIGRAM(S): at 21:05

## 2023-04-14 RX ADMIN — Medication 30 MILLIGRAM(S): at 13:05

## 2023-04-14 RX ADMIN — Medication 5 MILLIGRAM(S): at 21:04

## 2023-04-14 RX ADMIN — RISPERIDONE 0.25 MILLIGRAM(S): 4 TABLET ORAL at 21:04

## 2023-04-14 RX ADMIN — Medication 30 MILLIGRAM(S): at 21:35

## 2023-04-14 RX ADMIN — AMPICILLIN SODIUM AND SULBACTAM SODIUM 200 GRAM(S): 250; 125 INJECTION, POWDER, FOR SUSPENSION INTRAMUSCULAR; INTRAVENOUS at 00:55

## 2023-04-14 RX ADMIN — AMLODIPINE BESYLATE 10 MILLIGRAM(S): 2.5 TABLET ORAL at 05:47

## 2023-04-14 RX ADMIN — Medication 500 MILLIGRAM(S): at 11:36
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
## 2023-04-15 PROCEDURE — 99232 SBSQ HOSP IP/OBS MODERATE 35: CPT | Mod: 24

## 2023-04-15 RX ORDER — DIPHENHYDRAMINE HCL 50 MG
50 CAPSULE ORAL EVERY 6 HOURS
Refills: 0 | Status: DISCONTINUED | OUTPATIENT
Start: 2023-04-15 | End: 2023-04-17

## 2023-04-15 RX ORDER — ZOLPIDEM TARTRATE 10 MG/1
10 TABLET ORAL ONCE
Refills: 0 | Status: DISCONTINUED | OUTPATIENT
Start: 2023-04-15 | End: 2023-04-15

## 2023-04-15 RX ADMIN — Medication 1 TABLET(S): at 17:42

## 2023-04-15 RX ADMIN — AMPICILLIN SODIUM AND SULBACTAM SODIUM 200 GRAM(S): 250; 125 INJECTION, POWDER, FOR SUSPENSION INTRAMUSCULAR; INTRAVENOUS at 23:35

## 2023-04-15 RX ADMIN — PANTOPRAZOLE SODIUM 40 MILLIGRAM(S): 20 TABLET, DELAYED RELEASE ORAL at 06:15

## 2023-04-15 RX ADMIN — Medication 30 MILLIGRAM(S): at 06:46

## 2023-04-15 RX ADMIN — ENOXAPARIN SODIUM 40 MILLIGRAM(S): 100 INJECTION SUBCUTANEOUS at 11:50

## 2023-04-15 RX ADMIN — LIDOCAINE 1 PATCH: 4 CREAM TOPICAL at 18:02

## 2023-04-15 RX ADMIN — Medication 30 MILLIGRAM(S): at 11:49

## 2023-04-15 RX ADMIN — Medication 5 MILLIGRAM(S): at 21:12

## 2023-04-15 RX ADMIN — Medication 30 MILLIGRAM(S): at 12:10

## 2023-04-15 RX ADMIN — ATENOLOL 100 MILLIGRAM(S): 25 TABLET ORAL at 06:13

## 2023-04-15 RX ADMIN — Medication 30 MILLIGRAM(S): at 06:16

## 2023-04-15 RX ADMIN — ZOLPIDEM TARTRATE 10 MILLIGRAM(S): 10 TABLET ORAL at 00:26

## 2023-04-15 RX ADMIN — AMPICILLIN SODIUM AND SULBACTAM SODIUM 200 GRAM(S): 250; 125 INJECTION, POWDER, FOR SUSPENSION INTRAMUSCULAR; INTRAVENOUS at 17:42

## 2023-04-15 RX ADMIN — Medication 30 MILLIGRAM(S): at 21:12

## 2023-04-15 RX ADMIN — Medication 500 MILLIGRAM(S): at 11:50

## 2023-04-15 RX ADMIN — AMLODIPINE BESYLATE 10 MILLIGRAM(S): 2.5 TABLET ORAL at 06:15

## 2023-04-15 RX ADMIN — Medication 50 MILLIGRAM(S): at 21:49

## 2023-04-15 RX ADMIN — AMPICILLIN SODIUM AND SULBACTAM SODIUM 200 GRAM(S): 250; 125 INJECTION, POWDER, FOR SUSPENSION INTRAMUSCULAR; INTRAVENOUS at 11:53

## 2023-04-15 RX ADMIN — RISPERIDONE 0.25 MILLIGRAM(S): 4 TABLET ORAL at 21:49

## 2023-04-15 RX ADMIN — Medication 1 MILLIGRAM(S): at 21:11

## 2023-04-15 RX ADMIN — SENNA PLUS 2 TABLET(S): 8.6 TABLET ORAL at 21:12

## 2023-04-15 RX ADMIN — LIDOCAINE 1 PATCH: 4 CREAM TOPICAL at 13:20

## 2023-04-15 RX ADMIN — ZOLPIDEM TARTRATE 10 MILLIGRAM(S): 10 TABLET ORAL at 22:05

## 2023-04-15 RX ADMIN — AMPICILLIN SODIUM AND SULBACTAM SODIUM 200 GRAM(S): 250; 125 INJECTION, POWDER, FOR SUSPENSION INTRAMUSCULAR; INTRAVENOUS at 06:18

## 2023-04-15 RX ADMIN — GABAPENTIN 300 MILLIGRAM(S): 400 CAPSULE ORAL at 06:16

## 2023-04-15 RX ADMIN — GABAPENTIN 300 MILLIGRAM(S): 400 CAPSULE ORAL at 17:42
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
## 2023-04-16 PROCEDURE — 99232 SBSQ HOSP IP/OBS MODERATE 35: CPT | Mod: 24,GC

## 2023-04-16 RX ADMIN — Medication 500 MILLIGRAM(S): at 11:44

## 2023-04-16 RX ADMIN — GABAPENTIN 300 MILLIGRAM(S): 400 CAPSULE ORAL at 05:36

## 2023-04-16 RX ADMIN — ZOLPIDEM TARTRATE 10 MILLIGRAM(S): 10 TABLET ORAL at 20:57

## 2023-04-16 RX ADMIN — AMPICILLIN SODIUM AND SULBACTAM SODIUM 200 GRAM(S): 250; 125 INJECTION, POWDER, FOR SUSPENSION INTRAMUSCULAR; INTRAVENOUS at 11:43

## 2023-04-16 RX ADMIN — GABAPENTIN 300 MILLIGRAM(S): 400 CAPSULE ORAL at 17:23

## 2023-04-16 RX ADMIN — SENNA PLUS 2 TABLET(S): 8.6 TABLET ORAL at 21:00

## 2023-04-16 RX ADMIN — Medication 30 MILLIGRAM(S): at 20:58

## 2023-04-16 RX ADMIN — Medication 5 MILLIGRAM(S): at 21:01

## 2023-04-16 RX ADMIN — ATENOLOL 100 MILLIGRAM(S): 25 TABLET ORAL at 05:37

## 2023-04-16 RX ADMIN — LIDOCAINE 1 PATCH: 4 CREAM TOPICAL at 17:20

## 2023-04-16 RX ADMIN — Medication 650 MILLIGRAM(S): at 21:00

## 2023-04-16 RX ADMIN — Medication 650 MILLIGRAM(S): at 22:00

## 2023-04-16 RX ADMIN — AMPICILLIN SODIUM AND SULBACTAM SODIUM 200 GRAM(S): 250; 125 INJECTION, POWDER, FOR SUSPENSION INTRAMUSCULAR; INTRAVENOUS at 05:35

## 2023-04-16 RX ADMIN — Medication 30 MILLIGRAM(S): at 06:04

## 2023-04-16 RX ADMIN — Medication 1 MILLIGRAM(S): at 20:57

## 2023-04-16 RX ADMIN — Medication 50 MILLIGRAM(S): at 22:40

## 2023-04-16 RX ADMIN — Medication 1 TABLET(S): at 21:01

## 2023-04-16 RX ADMIN — ENOXAPARIN SODIUM 40 MILLIGRAM(S): 100 INJECTION SUBCUTANEOUS at 11:43

## 2023-04-16 RX ADMIN — RISPERIDONE 0.25 MILLIGRAM(S): 4 TABLET ORAL at 22:40

## 2023-04-16 RX ADMIN — PANTOPRAZOLE SODIUM 40 MILLIGRAM(S): 20 TABLET, DELAYED RELEASE ORAL at 05:36

## 2023-04-16 RX ADMIN — AMLODIPINE BESYLATE 10 MILLIGRAM(S): 2.5 TABLET ORAL at 05:41

## 2023-04-16 RX ADMIN — LIDOCAINE 1 PATCH: 4 CREAM TOPICAL at 13:58

## 2023-04-16 RX ADMIN — Medication 50 MILLIGRAM(S): at 11:44

## 2023-04-16 NOTE — PROGRESS NOTE ADULT - SUBJECTIVE AND OBJECTIVE BOX
INTERVAL HISTORY:    Events past 24 hrs  no fevers, chills,   no oher complaints.     Vital Signs Last 24 Hrs  T(C): 36.1 (16 Apr 2023 13:17), Max: 37.2 (16 Apr 2023 08:45)  T(F): 96.9 (16 Apr 2023 13:17), Max: 98.9 (16 Apr 2023 08:45)  HR: 74 (16 Apr 2023 13:17) (74 - 86)  BP: 128/80 (16 Apr 2023 13:17) (97/53 - 161/76)  BP(mean): --  RR: 18 (16 Apr 2023 13:17) (18 - 18)  SpO2: 95% (16 Apr 2023 13:17) (95% - 100%)    Parameters below as of 15 Apr 2023 21:13  Patient On (Oxygen Delivery Method): room air      MEDICATIONS  (STANDING):  amLODIPine   Tablet 10 milliGRAM(s) Oral daily  ampicillin/sulbactam  IVPB 3 Gram(s) IV Intermittent every 6 hours  ascorbic acid 500 milliGRAM(s) Oral daily  atenolol  Tablet 100 milliGRAM(s) Oral daily  chlorhexidine 4% Liquid 1 Application(s) Topical daily  enoxaparin Injectable 40 milliGRAM(s) SubCutaneous every 24 hours  gabapentin 300 milliGRAM(s) Oral two times a day  hydrochlorothiazide 25 milliGRAM(s) Oral daily  lidocaine   4% Patch 1 Patch Transdermal every 24 hours  melatonin 5 milliGRAM(s) Oral at bedtime  multivitamin/minerals 1 Tablet(s) Oral daily  Nexletol (Bempedoic acid) 180mg tablet 1 Tablet(s),Nexletol (Bempedoic acid) 180mg tablet 1 Tablet(s) 1 Tablet(s) Oral daily  pantoprazole    Tablet 40 milliGRAM(s) Oral before breakfast  senna 2 Tablet(s) Oral daily    MEDICATIONS  (PRN):  acetaminophen     Tablet .. 650 milliGRAM(s) Oral every 6 hours PRN Temp greater or equal to 38C (100.4F), Mild Pain (1 - 3)  ALPRAZolam 1 milliGRAM(s) Oral two times a day PRN for anxiety  bacitracin   Ointment 1 Application(s) Topical two times a day PRN wound care  diphenhydrAMINE 50 milliGRAM(s) Oral every 6 hours PRN Rash and/or Itching  hydrOXYzine hydrochloride 25 milliGRAM(s) Oral at bedtime PRN Restlessness  ketorolac   Injectable 30 milliGRAM(s) IV Push every 6 hours PRN Moderate Pain (4 - 6)  oxycodone    5 mG/acetaminophen 325 mG 0.5 Tablet(s) Oral every 4 hours PRN Severe Pain (7 - 10)  risperiDONE   Tablet 0.25 milliGRAM(s) Oral at bedtime PRN anxiety  zolpidem 10 milliGRAM(s) Oral at bedtime PRN Insomnia    Allergies    sulfa drugs (Rash)    Intolerances      EXAM:    GENERAL: well built, well nourished, NAD, laying in bed comfortably, cooperative  HEAD:  Atraumatic, Normocephalic  CHEST/LUNG:  B/L good air entry, no tachypnea/increased WOB/retractions.   HEART: In no acute cardiopulmonary distress   NEUROLOGY: AAOx3, non-focal,  moves all four extremities  SKIN/Wound:   Right upper extremity s/p debridement and skin graft with good overall take,  Left medial ankle: Full thickness burn ~ 2x2cm, pink and moist dermis.  L thigh donor site healing, no active bleeding, duoderm changed and sealed with tegaderm  +dressing change performed
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
## 2023-04-17 ENCOUNTER — TRANSCRIPTION ENCOUNTER (OUTPATIENT)
Age: 58
End: 2023-04-17

## 2023-04-17 VITALS
RESPIRATION RATE: 18 BRPM | HEART RATE: 94 BPM | DIASTOLIC BLOOD PRESSURE: 69 MMHG | TEMPERATURE: 97 F | OXYGEN SATURATION: 96 % | SYSTOLIC BLOOD PRESSURE: 145 MMHG

## 2023-04-17 PROCEDURE — 99238 HOSP IP/OBS DSCHRG MGMT 30/<: CPT | Mod: 24

## 2023-04-17 RX ORDER — BACITRACIN ZINC 500 UNIT/G
1 OINTMENT IN PACKET (EA) TOPICAL
Qty: 1 | Refills: 1
Start: 2023-04-17 | End: 2023-06-15

## 2023-04-17 RX ORDER — BACITRACIN ZINC 500 UNIT/G
1 OINTMENT IN PACKET (EA) TOPICAL
Qty: 1 | Refills: 1
Start: 2023-04-17 | End: 2023-04-30

## 2023-04-17 RX ORDER — BACITRACIN ZINC 500 UNIT/G
1 OINTMENT IN PACKET (EA) TOPICAL
Qty: 30 | Refills: 0
Start: 2023-04-17 | End: 2023-04-23

## 2023-04-17 RX ADMIN — Medication 500 MILLIGRAM(S): at 11:37

## 2023-04-17 RX ADMIN — Medication 30 MILLIGRAM(S): at 06:30

## 2023-04-17 RX ADMIN — PANTOPRAZOLE SODIUM 40 MILLIGRAM(S): 20 TABLET, DELAYED RELEASE ORAL at 06:27

## 2023-04-17 RX ADMIN — Medication 50 MILLIGRAM(S): at 06:52

## 2023-04-17 RX ADMIN — ATENOLOL 100 MILLIGRAM(S): 25 TABLET ORAL at 06:31

## 2023-04-17 RX ADMIN — GABAPENTIN 300 MILLIGRAM(S): 400 CAPSULE ORAL at 06:28

## 2023-04-17 RX ADMIN — Medication 1 TABLET(S): at 11:37

## 2023-04-17 RX ADMIN — Medication 30 MILLIGRAM(S): at 06:51

## 2023-04-17 RX ADMIN — AMLODIPINE BESYLATE 10 MILLIGRAM(S): 2.5 TABLET ORAL at 06:28
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
## 2023-04-17 NOTE — DISCHARGE NOTE NURSING/CASE MANAGEMENT/SOCIAL WORK - NSDCFUADDAPPT_GEN_ALL_CORE_FT
Please call [phone] to make a follow up appointment within 1 week with Dr. Rosenbaum or Dr. Chamorro. Clinic is located at [address] [place] [address] on Tuesdays (2-4pm) or Thursdays (9am-1pm).

## 2023-04-17 NOTE — DISCHARGE NOTE NURSING/CASE MANAGEMENT/SOCIAL WORK - NSDCVIVACCINE_GEN_ALL_CORE_FT
Td (adult) preservative free; 04-Apr-2023 18:42; Lopresti, Jessica (RN); [employer]; [id number] (Exp. Date: 21-Mar-2025); IntraMuscular; Deltoid Left.; 0.5 milliLiter(s); VIS (VIS Published: 04-Apr-2023, VIS Presented: 04-Apr-2023);

## 2023-04-17 NOTE — DISCHARGE NOTE NURSING/CASE MANAGEMENT/SOCIAL WORK - PATIENT PORTAL LINK FT
You can access the FollowMyHealth Patient Portal offered by [facility] by registering at the following website: [email or web address]. By joining [employer]’s FollowMyHealth portal, you will also be able to view your health information using other applications (apps) compatible with our system.
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
## 2023-04-17 NOTE — DISCHARGE NOTE NURSING/CASE MANAGEMENT/SOCIAL WORK - NSDCPEFALRISK_GEN_ALL_CORE
For information on Fall & Injury Prevention, visit: [email or web address] OR  [email or web address] OR  https://www.cdc.gov/steadi/patient.html
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
## 2023-04-19 PROBLEM — I10 ESSENTIAL (PRIMARY) HYPERTENSION: Chronic | Status: ACTIVE | Noted: 2023-04-04

## 2023-04-19 PROBLEM — C44.90 UNSPECIFIED MALIGNANT NEOPLASM OF SKIN, UNSPECIFIED: Chronic | Status: ACTIVE | Noted: 2023-04-04

## 2023-04-19 PROBLEM — E78.1 PURE HYPERGLYCERIDEMIA: Chronic | Status: ACTIVE | Noted: 2023-04-04

## 2023-04-19 PROBLEM — F41.9 ANXIETY DISORDER, UNSPECIFIED: Chronic | Status: ACTIVE | Noted: 2023-04-04

## 2023-04-20 ENCOUNTER — OUTPATIENT (OUTPATIENT)
Dept: OUTPATIENT SERVICES | Facility: HOSPITAL | Age: 58
LOS: 1 days | End: 2023-04-20
Payer: MEDICARE

## 2023-04-20 ENCOUNTER — APPOINTMENT (OUTPATIENT)
Dept: BURN CARE | Facility: CLINIC | Age: 58
End: 2023-04-20
Payer: MEDICARE

## 2023-04-20 VITALS — TEMPERATURE: 98.7 F | HEART RATE: 78 BPM | SYSTOLIC BLOOD PRESSURE: 120 MMHG | DIASTOLIC BLOOD PRESSURE: 78 MMHG

## 2023-04-20 DIAGNOSIS — Z00.8 ENCOUNTER FOR OTHER GENERAL EXAMINATION: ICD-10-CM

## 2023-04-20 DIAGNOSIS — Z98.890 OTHER SPECIFIED POSTPROCEDURAL STATES: Chronic | ICD-10-CM

## 2023-04-20 DIAGNOSIS — N20.0 CALCULUS OF KIDNEY: Chronic | ICD-10-CM

## 2023-04-20 PROCEDURE — 99024 POSTOP FOLLOW-UP VISIT: CPT

## 2023-04-20 PROCEDURE — 99212 OFFICE O/P EST SF 10 MIN: CPT

## 2023-04-20 NOTE — HISTORY OF PRESENT ILLNESS
[Did you have an operation on your burn/wound injury?] : Did you have an operation on your burn/wound injury? Yes [Did this injury occur on the job?] : Did this injury occur on the job? No [de-identified] : 2 weeks ago [de-identified] : home  [de-identified] : healing
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
## 2023-04-21 DIAGNOSIS — T31.0 BURNS INVOLVING LESS THAN 10% OF BODY SURFACE: ICD-10-CM

## 2023-04-21 DIAGNOSIS — T22.30XD BURN OF THIRD DEGREE OF SHOULDER AND UPPER LIMB, EXCEPT WRIST AND HAND, UNSPECIFIED SITE, SUBSEQUENT ENCOUNTER: ICD-10-CM
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
## 2023-04-21 DIAGNOSIS — Z98.890 OTHER SPECIFIED POSTPROCEDURAL STATES: ICD-10-CM

## 2023-04-26 DIAGNOSIS — T23.301A BURN OF THIRD DEGREE OF RIGHT HAND, UNSPECIFIED SITE, INITIAL ENCOUNTER: ICD-10-CM

## 2023-04-26 DIAGNOSIS — T23.331A BURN OF THIRD DEGREE OF MULTIPLE RIGHT FINGERS (NAIL), NOT INCLUDING THUMB, INITIAL ENCOUNTER: ICD-10-CM

## 2023-04-26 DIAGNOSIS — T22.311A BURN OF THIRD DEGREE OF RIGHT FOREARM, INITIAL ENCOUNTER: ICD-10-CM

## 2023-04-26 DIAGNOSIS — F32.A DEPRESSION, UNSPECIFIED: ICD-10-CM
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
## 2023-04-26 DIAGNOSIS — Z87.891 PERSONAL HISTORY OF NICOTINE DEPENDENCE: ICD-10-CM

## 2023-04-26 DIAGNOSIS — I10 ESSENTIAL (PRIMARY) HYPERTENSION: ICD-10-CM

## 2023-04-26 DIAGNOSIS — T31.0 BURNS INVOLVING LESS THAN 10% OF BODY SURFACE: ICD-10-CM

## 2023-04-26 DIAGNOSIS — Z88.2 ALLERGY STATUS TO SULFONAMIDES: ICD-10-CM

## 2023-04-26 DIAGNOSIS — E78.5 HYPERLIPIDEMIA, UNSPECIFIED: ICD-10-CM

## 2023-04-26 DIAGNOSIS — F41.9 ANXIETY DISORDER, UNSPECIFIED: ICD-10-CM

## 2023-04-26 DIAGNOSIS — Z87.442 PERSONAL HISTORY OF URINARY CALCULI: ICD-10-CM
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
## 2023-04-26 DIAGNOSIS — T25.322A BURN OF THIRD DEGREE OF LEFT FOOT, INITIAL ENCOUNTER: ICD-10-CM

## 2023-04-26 DIAGNOSIS — Z85.828 PERSONAL HISTORY OF OTHER MALIGNANT NEOPLASM OF SKIN: ICD-10-CM
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
## 2023-04-27 ENCOUNTER — APPOINTMENT (OUTPATIENT)
Dept: BURN CARE | Facility: CLINIC | Age: 58
End: 2023-04-27
Payer: MEDICARE

## 2023-04-27 ENCOUNTER — OUTPATIENT (OUTPATIENT)
Dept: OUTPATIENT SERVICES | Facility: HOSPITAL | Age: 58
LOS: 1 days | End: 2023-04-27
Payer: MEDICARE

## 2023-04-27 VITALS — SYSTOLIC BLOOD PRESSURE: 126 MMHG | DIASTOLIC BLOOD PRESSURE: 89 MMHG | HEART RATE: 78 BPM | TEMPERATURE: 97.9 F

## 2023-04-27 DIAGNOSIS — Z98.890 OTHER SPECIFIED POSTPROCEDURAL STATES: Chronic | ICD-10-CM

## 2023-04-27 DIAGNOSIS — Z00.8 ENCOUNTER FOR OTHER GENERAL EXAMINATION: ICD-10-CM

## 2023-04-27 DIAGNOSIS — N20.0 CALCULUS OF KIDNEY: Chronic | ICD-10-CM

## 2023-04-27 PROCEDURE — 16020 DRESS/DEBRID P-THICK BURN S: CPT | Mod: 58
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
## 2023-04-28 DIAGNOSIS — T22.30XD BURN OF THIRD DEGREE OF SHOULDER AND UPPER LIMB, EXCEPT WRIST AND HAND, UNSPECIFIED SITE, SUBSEQUENT ENCOUNTER: ICD-10-CM
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
## 2023-04-28 DIAGNOSIS — T31.0 BURNS INVOLVING LESS THAN 10% OF BODY SURFACE: ICD-10-CM

## 2023-04-28 DIAGNOSIS — Z98.890 OTHER SPECIFIED POSTPROCEDURAL STATES: ICD-10-CM

## 2023-05-04 ENCOUNTER — APPOINTMENT (OUTPATIENT)
Dept: BURN CARE | Facility: CLINIC | Age: 58
End: 2023-05-04
Payer: MEDICARE

## 2023-05-04 ENCOUNTER — OUTPATIENT (OUTPATIENT)
Dept: OUTPATIENT SERVICES | Facility: HOSPITAL | Age: 58
LOS: 1 days | End: 2023-05-04
Payer: MEDICARE

## 2023-05-04 VITALS — DIASTOLIC BLOOD PRESSURE: 95 MMHG | HEART RATE: 56 BPM | TEMPERATURE: 98.2 F | SYSTOLIC BLOOD PRESSURE: 163 MMHG

## 2023-05-04 DIAGNOSIS — N20.0 CALCULUS OF KIDNEY: Chronic | ICD-10-CM

## 2023-05-04 DIAGNOSIS — Z98.890 OTHER SPECIFIED POSTPROCEDURAL STATES: Chronic | ICD-10-CM

## 2023-05-04 DIAGNOSIS — Z00.8 ENCOUNTER FOR OTHER GENERAL EXAMINATION: ICD-10-CM

## 2023-05-04 PROCEDURE — 16020 DRESS/DEBRID P-THICK BURN S: CPT | Mod: 58

## 2023-05-04 PROCEDURE — 16020 DRESS/DEBRID P-THICK BURN S: CPT

## 2023-05-04 RX ORDER — HYDROCORTISONE 25 MG/G
2.5 OINTMENT TOPICAL
Qty: 2 | Refills: 2 | Status: ACTIVE | COMMUNITY
Start: 2023-05-04 | End: 1900-01-01
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
## 2023-05-05 DIAGNOSIS — T22.30XD BURN OF THIRD DEGREE OF SHOULDER AND UPPER LIMB, EXCEPT WRIST AND HAND, UNSPECIFIED SITE, SUBSEQUENT ENCOUNTER: ICD-10-CM
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
## 2023-05-05 DIAGNOSIS — Z98.890 OTHER SPECIFIED POSTPROCEDURAL STATES: ICD-10-CM

## 2023-05-05 DIAGNOSIS — T31.0 BURNS INVOLVING LESS THAN 10% OF BODY SURFACE: ICD-10-CM
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
## 2023-05-11 ENCOUNTER — OUTPATIENT (OUTPATIENT)
Dept: OUTPATIENT SERVICES | Facility: HOSPITAL | Age: 58
LOS: 1 days | End: 2023-05-11
Payer: MEDICARE

## 2023-05-11 ENCOUNTER — APPOINTMENT (OUTPATIENT)
Dept: BURN CARE | Facility: CLINIC | Age: 58
End: 2023-05-11
Payer: MEDICARE

## 2023-05-11 VITALS — SYSTOLIC BLOOD PRESSURE: 115 MMHG | HEART RATE: 96 BPM | DIASTOLIC BLOOD PRESSURE: 76 MMHG | TEMPERATURE: 97.6 F

## 2023-05-11 DIAGNOSIS — Z00.8 ENCOUNTER FOR OTHER GENERAL EXAMINATION: ICD-10-CM

## 2023-05-11 PROCEDURE — 16020 DRESS/DEBRID P-THICK BURN S: CPT | Mod: 58
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
## 2023-05-15 DIAGNOSIS — T22.30XD BURN OF THIRD DEGREE OF SHOULDER AND UPPER LIMB, EXCEPT WRIST AND HAND, UNSPECIFIED SITE, SUBSEQUENT ENCOUNTER: ICD-10-CM
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
## 2023-05-15 DIAGNOSIS — Z98.890 OTHER SPECIFIED POSTPROCEDURAL STATES: ICD-10-CM

## 2023-05-15 DIAGNOSIS — T31.0 BURNS INVOLVING LESS THAN 10% OF BODY SURFACE: ICD-10-CM

## 2023-06-01 ENCOUNTER — APPOINTMENT (OUTPATIENT)
Dept: BURN CARE | Facility: CLINIC | Age: 58
End: 2023-06-01
Payer: MEDICARE

## 2023-06-01 ENCOUNTER — OUTPATIENT (OUTPATIENT)
Dept: OUTPATIENT SERVICES | Facility: HOSPITAL | Age: 58
LOS: 1 days | End: 2023-06-01
Payer: MEDICARE

## 2023-06-01 VITALS — HEART RATE: 68 BPM | TEMPERATURE: 98 F | DIASTOLIC BLOOD PRESSURE: 99 MMHG | SYSTOLIC BLOOD PRESSURE: 138 MMHG

## 2023-06-01 DIAGNOSIS — Z00.8 ENCOUNTER FOR OTHER GENERAL EXAMINATION: ICD-10-CM

## 2023-06-01 DIAGNOSIS — Z98.890 OTHER SPECIFIED POSTPROCEDURAL STATES: Chronic | ICD-10-CM

## 2023-06-01 DIAGNOSIS — N20.0 CALCULUS OF KIDNEY: Chronic | ICD-10-CM

## 2023-06-01 PROCEDURE — 99024 POSTOP FOLLOW-UP VISIT: CPT

## 2023-06-01 PROCEDURE — 99212 OFFICE O/P EST SF 10 MIN: CPT
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
## 2023-06-02 DIAGNOSIS — Z09 ENCOUNTER FOR FOLLOW-UP EXAMINATION AFTER COMPLETED TREATMENT FOR CONDITIONS OTHER THAN MALIGNANT NEOPLASM: ICD-10-CM

## 2023-06-02 DIAGNOSIS — Z48.817 ENCOUNTER FOR SURGICAL AFTERCARE FOLLOWING SURGERY ON THE SKIN AND SUBCUTANEOUS TISSUE: ICD-10-CM

## 2023-06-02 DIAGNOSIS — Z87.828 PERSONAL HISTORY OF OTHER (HEALED) PHYSICAL INJURY AND TRAUMA: ICD-10-CM

## 2023-06-02 DIAGNOSIS — Z98.890 OTHER SPECIFIED POSTPROCEDURAL STATES: ICD-10-CM

## 2023-07-13 ENCOUNTER — APPOINTMENT (OUTPATIENT)
Dept: BURN CARE | Facility: CLINIC | Age: 58
End: 2023-07-13

## 2024-01-04 ENCOUNTER — OUTPATIENT (OUTPATIENT)
Dept: OUTPATIENT SERVICES | Facility: HOSPITAL | Age: 59
LOS: 1 days | End: 2024-01-04
Payer: MEDICARE

## 2024-01-04 DIAGNOSIS — R06.02 SHORTNESS OF BREATH: ICD-10-CM

## 2024-01-04 DIAGNOSIS — R05.9 COUGH, UNSPECIFIED: ICD-10-CM

## 2024-01-04 DIAGNOSIS — Z98.890 OTHER SPECIFIED POSTPROCEDURAL STATES: Chronic | ICD-10-CM

## 2024-01-04 DIAGNOSIS — N20.0 CALCULUS OF KIDNEY: Chronic | ICD-10-CM

## 2024-01-04 PROCEDURE — 71046 X-RAY EXAM CHEST 2 VIEWS: CPT

## 2024-01-04 PROCEDURE — 71046 X-RAY EXAM CHEST 2 VIEWS: CPT | Mod: 26

## 2024-01-05 DIAGNOSIS — R05.9 COUGH, UNSPECIFIED: ICD-10-CM

## 2024-01-05 DIAGNOSIS — R06.02 SHORTNESS OF BREATH: ICD-10-CM

## 2024-07-05 ENCOUNTER — OUTPATIENT (OUTPATIENT)
Dept: OUTPATIENT SERVICES | Facility: HOSPITAL | Age: 59
LOS: 1 days | End: 2024-07-05
Payer: MEDICARE

## 2024-07-05 DIAGNOSIS — Z12.31 ENCOUNTER FOR SCREENING MAMMOGRAM FOR MALIGNANT NEOPLASM OF BREAST: ICD-10-CM

## 2024-07-05 DIAGNOSIS — Z98.890 OTHER SPECIFIED POSTPROCEDURAL STATES: Chronic | ICD-10-CM

## 2024-07-05 DIAGNOSIS — Z00.8 ENCOUNTER FOR OTHER GENERAL EXAMINATION: ICD-10-CM

## 2024-07-05 DIAGNOSIS — N20.0 CALCULUS OF KIDNEY: Chronic | ICD-10-CM

## 2024-07-05 DIAGNOSIS — Z13.820 ENCOUNTER FOR SCREENING FOR OSTEOPOROSIS: ICD-10-CM

## 2024-07-05 PROCEDURE — 77067 SCR MAMMO BI INCL CAD: CPT | Mod: 26

## 2024-07-05 PROCEDURE — 77063 BREAST TOMOSYNTHESIS BI: CPT

## 2024-07-05 PROCEDURE — 77080 DXA BONE DENSITY AXIAL: CPT | Mod: 26

## 2024-07-05 PROCEDURE — 77080 DXA BONE DENSITY AXIAL: CPT

## 2024-07-05 PROCEDURE — 77063 BREAST TOMOSYNTHESIS BI: CPT | Mod: 26

## 2024-07-05 PROCEDURE — 77067 SCR MAMMO BI INCL CAD: CPT

## 2024-07-06 DIAGNOSIS — Z13.820 ENCOUNTER FOR SCREENING FOR OSTEOPOROSIS: ICD-10-CM

## 2024-07-06 DIAGNOSIS — Z12.31 ENCOUNTER FOR SCREENING MAMMOGRAM FOR MALIGNANT NEOPLASM OF BREAST: ICD-10-CM

## 2024-11-01 ENCOUNTER — OUTPATIENT (OUTPATIENT)
Dept: OUTPATIENT SERVICES | Facility: HOSPITAL | Age: 59
LOS: 1 days | End: 2024-11-01
Payer: MEDICARE

## 2024-11-01 DIAGNOSIS — Z98.890 OTHER SPECIFIED POSTPROCEDURAL STATES: Chronic | ICD-10-CM

## 2024-11-01 DIAGNOSIS — R10.2 PELVIC AND PERINEAL PAIN: ICD-10-CM

## 2024-11-01 DIAGNOSIS — N20.0 CALCULUS OF KIDNEY: Chronic | ICD-10-CM

## 2024-11-01 DIAGNOSIS — Z00.8 ENCOUNTER FOR OTHER GENERAL EXAMINATION: ICD-10-CM

## 2024-11-01 PROCEDURE — 76700 US EXAM ABDOM COMPLETE: CPT | Mod: 26

## 2024-11-01 PROCEDURE — 76856 US EXAM PELVIC COMPLETE: CPT | Mod: 26

## 2024-11-01 PROCEDURE — 76856 US EXAM PELVIC COMPLETE: CPT

## 2024-11-01 PROCEDURE — 76700 US EXAM ABDOM COMPLETE: CPT

## 2024-11-02 DIAGNOSIS — R10.9 UNSPECIFIED ABDOMINAL PAIN: ICD-10-CM

## 2024-11-02 DIAGNOSIS — R10.2 PELVIC AND PERINEAL PAIN: ICD-10-CM

## 2025-08-26 ENCOUNTER — APPOINTMENT (OUTPATIENT)
Dept: CARDIOTHORACIC SURGERY | Facility: CLINIC | Age: 60
End: 2025-08-26

## 2025-09-03 ENCOUNTER — OUTPATIENT (OUTPATIENT)
Dept: OUTPATIENT SERVICES | Facility: HOSPITAL | Age: 60
LOS: 1 days | End: 2025-09-03
Payer: MEDICARE

## 2025-09-03 DIAGNOSIS — N64.4 MASTODYNIA: ICD-10-CM

## 2025-09-03 DIAGNOSIS — Z98.890 OTHER SPECIFIED POSTPROCEDURAL STATES: Chronic | ICD-10-CM

## 2025-09-03 DIAGNOSIS — N20.0 CALCULUS OF KIDNEY: Chronic | ICD-10-CM

## 2025-09-03 PROCEDURE — G0279: CPT | Mod: 26

## 2025-09-03 PROCEDURE — 76641 ULTRASOUND BREAST COMPLETE: CPT | Mod: 26,50

## 2025-09-03 PROCEDURE — 77066 DX MAMMO INCL CAD BI: CPT | Mod: 26

## 2025-09-03 PROCEDURE — 77066 DX MAMMO INCL CAD BI: CPT

## 2025-09-03 PROCEDURE — G0279: CPT

## 2025-09-03 PROCEDURE — 76641 ULTRASOUND BREAST COMPLETE: CPT | Mod: 50

## 2025-09-04 DIAGNOSIS — N64.4 MASTODYNIA: ICD-10-CM
